# Patient Record
Sex: MALE | Race: WHITE | NOT HISPANIC OR LATINO
[De-identification: names, ages, dates, MRNs, and addresses within clinical notes are randomized per-mention and may not be internally consistent; named-entity substitution may affect disease eponyms.]

---

## 2017-02-02 PROBLEM — G47.00 INSOMNIA, UNSPECIFIED TYPE: Status: ACTIVE | Noted: 2017-02-02

## 2017-03-06 ENCOUNTER — RX RENEWAL (OUTPATIENT)
Age: 47
End: 2017-03-06

## 2017-03-06 DIAGNOSIS — K21.9 GASTRO-ESOPHAGEAL REFLUX DISEASE W/OUT ESOPHAGITIS: ICD-10-CM

## 2017-03-15 ENCOUNTER — OUTPATIENT (OUTPATIENT)
Dept: OUTPATIENT SERVICES | Facility: HOSPITAL | Age: 47
LOS: 1 days | End: 2017-03-15
Payer: COMMERCIAL

## 2017-03-15 PROCEDURE — 73590 X-RAY EXAM OF LOWER LEG: CPT | Mod: 26,LT

## 2017-03-15 PROCEDURE — 73590 X-RAY EXAM OF LOWER LEG: CPT

## 2017-04-09 ENCOUNTER — EMERGENCY (EMERGENCY)
Facility: HOSPITAL | Age: 47
LOS: 1 days | Discharge: HOME | End: 2017-04-09
Attending: EMERGENCY MEDICINE | Admitting: EMERGENCY MEDICINE
Payer: COMMERCIAL

## 2017-04-09 VITALS
TEMPERATURE: 98 F | SYSTOLIC BLOOD PRESSURE: 133 MMHG | RESPIRATION RATE: 18 BRPM | HEART RATE: 63 BPM | OXYGEN SATURATION: 100 % | DIASTOLIC BLOOD PRESSURE: 87 MMHG

## 2017-04-09 DIAGNOSIS — R07.89 OTHER CHEST PAIN: ICD-10-CM

## 2017-04-09 LAB
ALBUMIN SERPL ELPH-MCNC: 4.3 G/DL — SIGNIFICANT CHANGE UP (ref 3.4–5)
ALP SERPL-CCNC: 81 U/L — SIGNIFICANT CHANGE UP (ref 40–120)
ALT FLD-CCNC: 45 U/L — HIGH (ref 12–42)
ANION GAP SERPL CALC-SCNC: 7 MMOL/L — LOW (ref 9–16)
APTT BLD: 33.5 SEC — SIGNIFICANT CHANGE UP (ref 27.5–37.4)
AST SERPL-CCNC: 32 U/L — SIGNIFICANT CHANGE UP (ref 15–37)
BASOPHILS NFR BLD AUTO: 0.2 % — SIGNIFICANT CHANGE UP (ref 0–2)
BILIRUB SERPL-MCNC: 1 MG/DL — SIGNIFICANT CHANGE UP (ref 0.2–1.2)
BUN SERPL-MCNC: 16 MG/DL — SIGNIFICANT CHANGE UP (ref 7–23)
CALCIUM SERPL-MCNC: 9.1 MG/DL — SIGNIFICANT CHANGE UP (ref 8.5–10.5)
CHLORIDE SERPL-SCNC: 105 MMOL/L — SIGNIFICANT CHANGE UP (ref 96–108)
CHOLEST SERPL-MCNC: 127 MG/DL — SIGNIFICANT CHANGE UP
CK MB CFR SERPL CALC: 1.8 NG/ML — SIGNIFICANT CHANGE UP (ref 0.5–3.6)
CK SERPL-CCNC: 292 U/L — SIGNIFICANT CHANGE UP (ref 39–308)
CO2 SERPL-SCNC: 27 MMOL/L — SIGNIFICANT CHANGE UP (ref 22–31)
CREAT SERPL-MCNC: 0.84 MG/DL — SIGNIFICANT CHANGE UP (ref 0.5–1.3)
EOSINOPHIL NFR BLD AUTO: 0.8 % — SIGNIFICANT CHANGE UP (ref 0–6)
GLUCOSE SERPL-MCNC: 98 MG/DL — SIGNIFICANT CHANGE UP (ref 70–99)
HCT VFR BLD CALC: 42.4 % — SIGNIFICANT CHANGE UP (ref 39–50)
HDLC SERPL-MCNC: 77 MG/DL — SIGNIFICANT CHANGE UP
HGB BLD-MCNC: 15.2 G/DL — SIGNIFICANT CHANGE UP (ref 13–17)
INR BLD: 1.06 — SIGNIFICANT CHANGE UP (ref 0.88–1.16)
LIPID PNL WITH DIRECT LDL SERPL: 41 MG/DL — SIGNIFICANT CHANGE UP
LYMPHOCYTES # BLD AUTO: 46.8 % — HIGH (ref 13–44)
MCHC RBC-ENTMCNC: 30.2 PG — SIGNIFICANT CHANGE UP (ref 27–34)
MCHC RBC-ENTMCNC: 35.8 G/DL — SIGNIFICANT CHANGE UP (ref 32–36)
MCV RBC AUTO: 84.1 FL — SIGNIFICANT CHANGE UP (ref 80–100)
MONOCYTES NFR BLD AUTO: 10.4 % — SIGNIFICANT CHANGE UP (ref 2–14)
NEUTROPHILS NFR BLD AUTO: 41.8 % — LOW (ref 43–77)
PLATELET # BLD AUTO: 178 K/UL — SIGNIFICANT CHANGE UP (ref 150–400)
POTASSIUM SERPL-MCNC: 3.9 MMOL/L — SIGNIFICANT CHANGE UP (ref 3.5–5.3)
POTASSIUM SERPL-SCNC: 3.9 MMOL/L — SIGNIFICANT CHANGE UP (ref 3.5–5.3)
PROT SERPL-MCNC: 7.6 G/DL — SIGNIFICANT CHANGE UP (ref 6.4–8.2)
PROTHROM AB SERPL-ACNC: 11.8 SEC — SIGNIFICANT CHANGE UP (ref 9.8–12.7)
RBC # BLD: 5.04 M/UL — SIGNIFICANT CHANGE UP (ref 4.2–5.8)
RBC # FLD: 13.4 % — SIGNIFICANT CHANGE UP (ref 10.3–16.9)
SODIUM SERPL-SCNC: 139 MMOL/L — SIGNIFICANT CHANGE UP (ref 135–145)
TOTAL CHOLESTEROL/HDL RATIO MEASUREMENT: 1.6 RATIO — SIGNIFICANT CHANGE UP
TRIGL SERPL-MCNC: 44 MG/DL — SIGNIFICANT CHANGE UP
TROPONIN I SERPL-MCNC: <0.015 NG/ML — SIGNIFICANT CHANGE UP (ref 0.01–0.04)
WBC # BLD: 4.9 K/UL — SIGNIFICANT CHANGE UP (ref 3.8–10.5)
WBC # FLD AUTO: 4.9 K/UL — SIGNIFICANT CHANGE UP (ref 3.8–10.5)

## 2017-04-09 PROCEDURE — 99285 EMERGENCY DEPT VISIT HI MDM: CPT | Mod: 25

## 2017-04-09 PROCEDURE — 71010: CPT | Mod: 26

## 2017-04-09 PROCEDURE — 93306 TTE W/DOPPLER COMPLETE: CPT | Mod: 26

## 2017-04-09 PROCEDURE — 93010 ELECTROCARDIOGRAM REPORT: CPT

## 2017-04-09 RX ORDER — FAMOTIDINE 10 MG/ML
20 INJECTION INTRAVENOUS ONCE
Qty: 0 | Refills: 0 | Status: COMPLETED | OUTPATIENT
Start: 2017-04-09 | End: 2017-04-09

## 2017-04-09 RX ADMIN — FAMOTIDINE 20 MILLIGRAM(S): 10 INJECTION INTRAVENOUS at 14:46

## 2017-04-09 NOTE — ED ADULT TRIAGE NOTE - ARRIVAL INFO ADDITIONAL COMMENTS
Mid sternal radiates to left chest. Denies any SOB, dizziness, productive cough, fever/chills, N/V/D. Denies any cardiac/ hx.

## 2017-04-09 NOTE — ED PROVIDER NOTE - PROGRESS NOTE DETAILS
case d/w  dr josephine velasco for dc  VSS  echo neg for wall motion abnormalites-- return precautions d/w pt including recurrent CP or sob  or N/V

## 2017-04-09 NOTE — ED PROVIDER NOTE - OBJECTIVE STATEMENT
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 Neg stress thallium in the past 6 months-- No family hx of premature CAD or MI- occasional ETOH  no smoking -no clear hx of GERD

## 2017-04-09 NOTE — ED ADULT NURSE NOTE - OBJECTIVE STATEMENT
pt to ER w/ report of intermittent L-sided cp w/ varying focal points in L. chest.  Pt denies sob/f/c/n/v/diaphoresis.  Pt reports pain all night last night preventing him from sleeping and again this afternoon.  12 lead ekg done and shown to ER attending physician.  Pt maintained on CM. IV access established, labs drawn and sent. istat troponin 0.00.  NAD.  Breathing unlabored, skin warm and dry. echo cardiagram performed at bedside.  Will continue to monitor.

## 2017-04-10 PROCEDURE — 99284 EMERGENCY DEPT VISIT MOD MDM: CPT | Mod: 25

## 2017-04-10 PROCEDURE — 93306 TTE W/DOPPLER COMPLETE: CPT

## 2017-04-10 PROCEDURE — 85610 PROTHROMBIN TIME: CPT

## 2017-04-10 PROCEDURE — 82553 CREATINE MB FRACTION: CPT

## 2017-04-10 PROCEDURE — 80061 LIPID PANEL: CPT

## 2017-04-10 PROCEDURE — 80053 COMPREHEN METABOLIC PANEL: CPT

## 2017-04-10 PROCEDURE — 85730 THROMBOPLASTIN TIME PARTIAL: CPT

## 2017-04-10 PROCEDURE — 84484 ASSAY OF TROPONIN QUANT: CPT

## 2017-04-10 PROCEDURE — 96374 THER/PROPH/DIAG INJ IV PUSH: CPT

## 2017-04-10 PROCEDURE — 85025 COMPLETE CBC W/AUTO DIFF WBC: CPT

## 2017-04-10 PROCEDURE — 93005 ELECTROCARDIOGRAM TRACING: CPT

## 2017-04-10 PROCEDURE — 71045 X-RAY EXAM CHEST 1 VIEW: CPT

## 2017-04-10 PROCEDURE — 82550 ASSAY OF CK (CPK): CPT

## 2017-05-02 ENCOUNTER — APPOINTMENT (OUTPATIENT)
Dept: ORTHOPEDIC SURGERY | Facility: CLINIC | Age: 47
End: 2017-05-02

## 2017-05-08 ENCOUNTER — OUTPATIENT (OUTPATIENT)
Dept: OUTPATIENT SERVICES | Facility: HOSPITAL | Age: 47
LOS: 1 days | End: 2017-05-08
Payer: COMMERCIAL

## 2017-05-08 LAB
ALBUMIN SERPL ELPH-MCNC: 4.5 G/DL — SIGNIFICANT CHANGE UP (ref 3.4–5)
ALP SERPL-CCNC: 76 U/L — SIGNIFICANT CHANGE UP (ref 40–120)
ALT FLD-CCNC: 31 U/L — SIGNIFICANT CHANGE UP (ref 12–42)
ANION GAP SERPL CALC-SCNC: 4 MMOL/L — LOW (ref 9–16)
AST SERPL-CCNC: 29 U/L — SIGNIFICANT CHANGE UP (ref 15–37)
BASOPHILS NFR BLD AUTO: 0.2 % — SIGNIFICANT CHANGE UP (ref 0–2)
BILIRUB SERPL-MCNC: 1.2 MG/DL — SIGNIFICANT CHANGE UP (ref 0.2–1.2)
BUN SERPL-MCNC: 16 MG/DL — SIGNIFICANT CHANGE UP (ref 7–23)
CALCIUM SERPL-MCNC: 9 MG/DL — SIGNIFICANT CHANGE UP (ref 8.5–10.5)
CHLORIDE SERPL-SCNC: 106 MMOL/L — SIGNIFICANT CHANGE UP (ref 96–108)
CHOLEST SERPL-MCNC: 114 MG/DL — SIGNIFICANT CHANGE UP
CO2 SERPL-SCNC: 27 MMOL/L — SIGNIFICANT CHANGE UP (ref 22–31)
CREAT SERPL-MCNC: 0.87 MG/DL — SIGNIFICANT CHANGE UP (ref 0.5–1.3)
EOSINOPHIL NFR BLD AUTO: 0.6 % — SIGNIFICANT CHANGE UP (ref 0–6)
GLUCOSE SERPL-MCNC: 96 MG/DL — SIGNIFICANT CHANGE UP (ref 70–99)
HCT VFR BLD CALC: 42.6 % — SIGNIFICANT CHANGE UP (ref 39–50)
HDLC SERPL-MCNC: 76 MG/DL — SIGNIFICANT CHANGE UP
HGB BLD-MCNC: 14.7 G/DL — SIGNIFICANT CHANGE UP (ref 13–17)
LIPID PNL WITH DIRECT LDL SERPL: 31 MG/DL — SIGNIFICANT CHANGE UP
LYMPHOCYTES # BLD AUTO: 34.3 % — SIGNIFICANT CHANGE UP (ref 13–44)
MCHC RBC-ENTMCNC: 29.6 PG — SIGNIFICANT CHANGE UP (ref 27–34)
MCHC RBC-ENTMCNC: 34.5 G/DL — SIGNIFICANT CHANGE UP (ref 32–36)
MCV RBC AUTO: 85.7 FL — SIGNIFICANT CHANGE UP (ref 80–100)
MONOCYTES NFR BLD AUTO: 6.6 % — SIGNIFICANT CHANGE UP (ref 2–14)
NEUTROPHILS NFR BLD AUTO: 58.3 % — SIGNIFICANT CHANGE UP (ref 43–77)
PLATELET # BLD AUTO: 179 K/UL — SIGNIFICANT CHANGE UP (ref 150–400)
POTASSIUM SERPL-MCNC: 4.2 MMOL/L — SIGNIFICANT CHANGE UP (ref 3.5–5.3)
POTASSIUM SERPL-SCNC: 4.2 MMOL/L — SIGNIFICANT CHANGE UP (ref 3.5–5.3)
PROT SERPL-MCNC: 7.6 G/DL — SIGNIFICANT CHANGE UP (ref 6.4–8.2)
RBC # BLD: 4.97 M/UL — SIGNIFICANT CHANGE UP (ref 4.2–5.8)
RBC # FLD: 13.7 % — SIGNIFICANT CHANGE UP (ref 10.3–16.9)
SODIUM SERPL-SCNC: 137 MMOL/L — SIGNIFICANT CHANGE UP (ref 135–145)
TOTAL CHOLESTEROL/HDL RATIO MEASUREMENT: 1.5 RATIO — SIGNIFICANT CHANGE UP
TRIGL SERPL-MCNC: 35 MG/DL — SIGNIFICANT CHANGE UP
WBC # BLD: 6.2 K/UL — SIGNIFICANT CHANGE UP (ref 3.8–10.5)
WBC # FLD AUTO: 6.2 K/UL — SIGNIFICANT CHANGE UP (ref 3.8–10.5)

## 2017-05-08 PROCEDURE — 80061 LIPID PANEL: CPT

## 2017-05-08 PROCEDURE — 85025 COMPLETE CBC W/AUTO DIFF WBC: CPT

## 2017-05-08 PROCEDURE — 80053 COMPREHEN METABOLIC PANEL: CPT

## 2017-05-09 DIAGNOSIS — Z00.00 ENCOUNTER FOR GENERAL ADULT MEDICAL EXAMINATION WITHOUT ABNORMAL FINDINGS: ICD-10-CM

## 2017-06-01 ENCOUNTER — OUTPATIENT (OUTPATIENT)
Dept: OUTPATIENT SERVICES | Facility: HOSPITAL | Age: 47
LOS: 1 days | End: 2017-06-01
Payer: COMMERCIAL

## 2017-06-01 PROCEDURE — 87070 CULTURE OTHR SPECIMN AEROBIC: CPT

## 2017-06-01 PROCEDURE — 87075 CULTR BACTERIA EXCEPT BLOOD: CPT

## 2017-06-01 PROCEDURE — 87186 SC STD MICRODIL/AGAR DIL: CPT

## 2017-06-02 DIAGNOSIS — S81.809A UNSPECIFIED OPEN WOUND, UNSPECIFIED LOWER LEG, INITIAL ENCOUNTER: ICD-10-CM

## 2017-06-02 LAB
GRAM STN FLD: SIGNIFICANT CHANGE UP
SPECIMEN SOURCE: SIGNIFICANT CHANGE UP

## 2017-06-05 LAB
-  AMPICILLIN: SIGNIFICANT CHANGE UP
-  LINEZOLID: SIGNIFICANT CHANGE UP
CULTURE RESULTS: SIGNIFICANT CHANGE UP
METHOD TYPE: SIGNIFICANT CHANGE UP
ORGANISM # SPEC MICROSCOPIC CNT: SIGNIFICANT CHANGE UP
ORGANISM # SPEC MICROSCOPIC CNT: SIGNIFICANT CHANGE UP
SPECIMEN SOURCE: SIGNIFICANT CHANGE UP

## 2017-07-10 ENCOUNTER — FORM ENCOUNTER (OUTPATIENT)
Age: 47
End: 2017-07-10

## 2017-07-10 ENCOUNTER — OTHER (OUTPATIENT)
Age: 47
End: 2017-07-10

## 2017-07-11 ENCOUNTER — OUTPATIENT (OUTPATIENT)
Dept: OUTPATIENT SERVICES | Facility: HOSPITAL | Age: 47
LOS: 1 days | End: 2017-07-11
Payer: COMMERCIAL

## 2017-07-11 ENCOUNTER — RX RENEWAL (OUTPATIENT)
Age: 47
End: 2017-07-11

## 2017-07-11 PROCEDURE — 73721 MRI JNT OF LWR EXTRE W/O DYE: CPT | Mod: 26,LT

## 2017-07-11 PROCEDURE — 73721 MRI JNT OF LWR EXTRE W/O DYE: CPT

## 2017-09-08 ENCOUNTER — OUTPATIENT (OUTPATIENT)
Dept: OUTPATIENT SERVICES | Facility: HOSPITAL | Age: 47
LOS: 1 days | End: 2017-09-08
Payer: COMMERCIAL

## 2017-09-08 PROCEDURE — 76870 US EXAM SCROTUM: CPT | Mod: 26

## 2017-09-08 PROCEDURE — 76870 US EXAM SCROTUM: CPT

## 2017-10-02 ENCOUNTER — MEDICATION RENEWAL (OUTPATIENT)
Age: 47
End: 2017-10-02

## 2017-11-21 ENCOUNTER — APPOINTMENT (OUTPATIENT)
Dept: ORTHOPEDIC SURGERY | Facility: CLINIC | Age: 47
End: 2017-11-21
Payer: COMMERCIAL

## 2017-11-21 DIAGNOSIS — M22.2X1 PATELLOFEMORAL DISORDERS, RIGHT KNEE: ICD-10-CM

## 2017-11-21 PROCEDURE — 99213 OFFICE O/P EST LOW 20 MIN: CPT

## 2017-11-21 RX ORDER — DICLOFENAC SODIUM 100 MG/1
100 TABLET, FILM COATED, EXTENDED RELEASE ORAL
Qty: 10 | Refills: 0 | Status: DISCONTINUED | COMMUNITY
Start: 2017-09-14

## 2017-11-21 RX ORDER — DOXYCYCLINE HYCLATE 100 MG/1
100 CAPSULE ORAL
Qty: 60 | Refills: 0 | Status: DISCONTINUED | COMMUNITY
Start: 2017-08-05

## 2017-11-21 RX ORDER — HYALURONATE SOD, CROSS-LINKED 30 MG/3 ML
30 SYRINGE (ML) INTRAARTICULAR
Qty: 1 | Refills: 0 | Status: DISCONTINUED | COMMUNITY
Start: 2017-07-10 | End: 2017-09-18

## 2017-11-21 RX ORDER — AMOXICILLIN AND CLAVULANATE POTASSIUM 875; 125 MG/1; MG/1
875-125 TABLET, COATED ORAL
Qty: 14 | Refills: 0 | Status: DISCONTINUED | COMMUNITY
Start: 2017-09-14

## 2017-11-26 ENCOUNTER — FORM ENCOUNTER (OUTPATIENT)
Age: 47
End: 2017-11-26

## 2017-11-27 ENCOUNTER — OUTPATIENT (OUTPATIENT)
Dept: OUTPATIENT SERVICES | Facility: HOSPITAL | Age: 47
LOS: 1 days | End: 2017-11-27
Payer: COMMERCIAL

## 2017-11-27 PROCEDURE — 73721 MRI JNT OF LWR EXTRE W/O DYE: CPT | Mod: 26,RT

## 2017-11-27 PROCEDURE — 73721 MRI JNT OF LWR EXTRE W/O DYE: CPT

## 2018-01-25 ENCOUNTER — RX RENEWAL (OUTPATIENT)
Age: 48
End: 2018-01-25

## 2018-03-13 ENCOUNTER — APPOINTMENT (OUTPATIENT)
Dept: ORTHOPEDIC SURGERY | Facility: CLINIC | Age: 48
End: 2018-03-13
Payer: COMMERCIAL

## 2018-03-13 DIAGNOSIS — M76.899 OTHER SPECIFIED ENTHESOPATHIES OF UNSPECIFIED LOWER LIMB, EXCLUDING FOOT: ICD-10-CM

## 2018-03-13 PROCEDURE — 99214 OFFICE O/P EST MOD 30 MIN: CPT

## 2018-03-21 PROBLEM — M76.899 QUADRICEPS TENDINITIS: Status: ACTIVE | Noted: 2018-03-21

## 2018-05-14 ENCOUNTER — OUTPATIENT (OUTPATIENT)
Dept: OUTPATIENT SERVICES | Facility: HOSPITAL | Age: 48
LOS: 1 days | End: 2018-05-14
Payer: COMMERCIAL

## 2018-05-14 PROCEDURE — 73610 X-RAY EXAM OF ANKLE: CPT | Mod: 26,LT

## 2018-05-15 ENCOUNTER — OUTPATIENT (OUTPATIENT)
Dept: OUTPATIENT SERVICES | Facility: HOSPITAL | Age: 48
LOS: 1 days | End: 2018-05-15

## 2018-05-15 PROCEDURE — 73721 MRI JNT OF LWR EXTRE W/O DYE: CPT | Mod: 26,LT

## 2018-05-15 PROCEDURE — 73721 MRI JNT OF LWR EXTRE W/O DYE: CPT

## 2018-05-15 PROCEDURE — 73610 X-RAY EXAM OF ANKLE: CPT

## 2018-06-27 ENCOUNTER — APPOINTMENT (OUTPATIENT)
Dept: ORTHOPEDIC SURGERY | Facility: CLINIC | Age: 48
End: 2018-06-27

## 2018-08-03 ENCOUNTER — RX RENEWAL (OUTPATIENT)
Age: 48
End: 2018-08-03

## 2018-08-03 DIAGNOSIS — K52.9 NONINFECTIVE GASTROENTERITIS AND COLITIS, UNSPECIFIED: ICD-10-CM

## 2018-08-30 ENCOUNTER — FORM ENCOUNTER (OUTPATIENT)
Age: 48
End: 2018-08-30

## 2018-08-31 ENCOUNTER — OUTPATIENT (OUTPATIENT)
Dept: OUTPATIENT SERVICES | Facility: HOSPITAL | Age: 48
LOS: 1 days | End: 2018-08-31
Payer: COMMERCIAL

## 2018-08-31 DIAGNOSIS — M25.532 PAIN IN RIGHT WRIST: ICD-10-CM

## 2018-08-31 DIAGNOSIS — M25.531 PAIN IN RIGHT WRIST: ICD-10-CM

## 2018-08-31 PROBLEM — R07.9 CHEST PAIN, UNSPECIFIED: Chronic | Status: ACTIVE | Noted: 2017-04-09

## 2018-08-31 PROCEDURE — 73110 X-RAY EXAM OF WRIST: CPT

## 2018-08-31 PROCEDURE — 73110 X-RAY EXAM OF WRIST: CPT | Mod: 26,50

## 2018-12-10 ENCOUNTER — OUTPATIENT (OUTPATIENT)
Dept: OUTPATIENT SERVICES | Facility: HOSPITAL | Age: 48
LOS: 1 days | End: 2018-12-10
Payer: COMMERCIAL

## 2018-12-10 DIAGNOSIS — Z03.89 ENCOUNTER FOR OBSERVATION FOR OTHER SUSPECTED DISEASES AND CONDITIONS RULED OUT: ICD-10-CM

## 2018-12-10 PROCEDURE — 71046 X-RAY EXAM CHEST 2 VIEWS: CPT

## 2018-12-10 PROCEDURE — 71046 X-RAY EXAM CHEST 2 VIEWS: CPT | Mod: 26

## 2019-06-28 ENCOUNTER — OUTPATIENT (OUTPATIENT)
Dept: OUTPATIENT SERVICES | Facility: HOSPITAL | Age: 49
LOS: 1 days | End: 2019-06-28
Payer: COMMERCIAL

## 2019-06-28 DIAGNOSIS — Z00.00 ENCOUNTER FOR GENERAL ADULT MEDICAL EXAMINATION WITHOUT ABNORMAL FINDINGS: ICD-10-CM

## 2019-06-28 PROCEDURE — 87340 HEPATITIS B SURFACE AG IA: CPT

## 2019-06-28 PROCEDURE — 86803 HEPATITIS C AB TEST: CPT

## 2019-06-28 PROCEDURE — 80061 LIPID PANEL: CPT

## 2019-06-28 PROCEDURE — 86709 HEPATITIS A IGM ANTIBODY: CPT

## 2019-06-28 PROCEDURE — 82248 BILIRUBIN DIRECT: CPT

## 2019-06-28 PROCEDURE — 36415 COLL VENOUS BLD VENIPUNCTURE: CPT

## 2019-06-28 PROCEDURE — 85027 COMPLETE CBC AUTOMATED: CPT

## 2019-06-28 PROCEDURE — 80053 COMPREHEN METABOLIC PANEL: CPT

## 2019-06-28 PROCEDURE — 86704 HEP B CORE ANTIBODY TOTAL: CPT

## 2019-07-05 RX ORDER — CANDESARTAN CILEXETIL 8 MG/1
1 TABLET ORAL
Qty: 30 | Refills: 1
Start: 2019-07-05 | End: 2019-09-02

## 2019-07-05 RX ORDER — ROSUVASTATIN CALCIUM 20 MG/1
1 TABLET ORAL
Qty: 30 | Refills: 1
Start: 2019-07-05 | End: 2019-09-02

## 2019-09-02 ENCOUNTER — EMERGENCY (EMERGENCY)
Facility: HOSPITAL | Age: 49
LOS: 1 days | Discharge: HOME | End: 2019-09-02
Attending: EMERGENCY MEDICINE | Admitting: EMERGENCY MEDICINE
Payer: COMMERCIAL

## 2019-09-02 VITALS
TEMPERATURE: 98 F | HEIGHT: 72 IN | OXYGEN SATURATION: 98 % | SYSTOLIC BLOOD PRESSURE: 163 MMHG | WEIGHT: 220.02 LBS | RESPIRATION RATE: 16 BRPM | DIASTOLIC BLOOD PRESSURE: 93 MMHG | HEART RATE: 87 BPM

## 2019-09-02 LAB
ALBUMIN SERPL ELPH-MCNC: 5 G/DL — SIGNIFICANT CHANGE UP (ref 3.3–5)
ALP SERPL-CCNC: 72 U/L — SIGNIFICANT CHANGE UP (ref 40–120)
ALT FLD-CCNC: 27 U/L — SIGNIFICANT CHANGE UP (ref 10–45)
ANION GAP SERPL CALC-SCNC: 14 MMOL/L — SIGNIFICANT CHANGE UP (ref 5–17)
APTT BLD: 30.7 SEC — SIGNIFICANT CHANGE UP (ref 27.5–36.3)
AST SERPL-CCNC: 26 U/L — SIGNIFICANT CHANGE UP (ref 10–40)
BASOPHILS # BLD AUTO: 0.01 K/UL — SIGNIFICANT CHANGE UP (ref 0–0.2)
BASOPHILS NFR BLD AUTO: 0.2 % — SIGNIFICANT CHANGE UP (ref 0–2)
BILIRUB SERPL-MCNC: 0.9 MG/DL — SIGNIFICANT CHANGE UP (ref 0.2–1.2)
BUN SERPL-MCNC: 13 MG/DL — SIGNIFICANT CHANGE UP (ref 7–23)
CALCIUM SERPL-MCNC: 9.8 MG/DL — SIGNIFICANT CHANGE UP (ref 8.4–10.5)
CHLORIDE SERPL-SCNC: 101 MMOL/L — SIGNIFICANT CHANGE UP (ref 96–108)
CHOLEST SERPL-MCNC: 131 MG/DL — SIGNIFICANT CHANGE UP (ref 10–199)
CO2 SERPL-SCNC: 26 MMOL/L — SIGNIFICANT CHANGE UP (ref 22–31)
CREAT SERPL-MCNC: 0.91 MG/DL — SIGNIFICANT CHANGE UP (ref 0.5–1.3)
EOSINOPHIL # BLD AUTO: 0.6 K/UL — HIGH (ref 0–0.5)
EOSINOPHIL NFR BLD AUTO: 9.7 % — HIGH (ref 0–6)
ERYTHROCYTE [SEDIMENTATION RATE] IN BLOOD: 3 MM/HR — SIGNIFICANT CHANGE UP
ERYTHROCYTE [SEDIMENTATION RATE] IN BLOOD: 3 MM/HR — SIGNIFICANT CHANGE UP
GLUCOSE SERPL-MCNC: 131 MG/DL — HIGH (ref 70–99)
HCT VFR BLD CALC: 45.6 % — SIGNIFICANT CHANGE UP (ref 39–50)
HDLC SERPL-MCNC: 77 MG/DL — SIGNIFICANT CHANGE UP
HGB BLD-MCNC: 15.3 G/DL — SIGNIFICANT CHANGE UP (ref 13–17)
IMM GRANULOCYTES NFR BLD AUTO: 0.2 % — SIGNIFICANT CHANGE UP (ref 0–1.5)
INR BLD: 1.21 — HIGH (ref 0.88–1.16)
LIPID PNL WITH DIRECT LDL SERPL: 42 MG/DL — SIGNIFICANT CHANGE UP
LYMPHOCYTES # BLD AUTO: 2.54 K/UL — SIGNIFICANT CHANGE UP (ref 1–3.3)
LYMPHOCYTES # BLD AUTO: 41 % — SIGNIFICANT CHANGE UP (ref 13–44)
MCHC RBC-ENTMCNC: 29.6 PG — SIGNIFICANT CHANGE UP (ref 27–34)
MCHC RBC-ENTMCNC: 33.6 GM/DL — SIGNIFICANT CHANGE UP (ref 32–36)
MCV RBC AUTO: 88.2 FL — SIGNIFICANT CHANGE UP (ref 80–100)
MONOCYTES # BLD AUTO: 0.48 K/UL — SIGNIFICANT CHANGE UP (ref 0–0.9)
MONOCYTES NFR BLD AUTO: 7.7 % — SIGNIFICANT CHANGE UP (ref 2–14)
NEUTROPHILS # BLD AUTO: 2.56 K/UL — SIGNIFICANT CHANGE UP (ref 1.8–7.4)
NEUTROPHILS NFR BLD AUTO: 41.2 % — LOW (ref 43–77)
NRBC # BLD: 0 /100 WBCS — SIGNIFICANT CHANGE UP (ref 0–0)
PLATELET # BLD AUTO: 171 K/UL — SIGNIFICANT CHANGE UP (ref 150–400)
POTASSIUM SERPL-MCNC: 3.8 MMOL/L — SIGNIFICANT CHANGE UP (ref 3.5–5.3)
POTASSIUM SERPL-SCNC: 3.8 MMOL/L — SIGNIFICANT CHANGE UP (ref 3.5–5.3)
PROT SERPL-MCNC: 7.8 G/DL — SIGNIFICANT CHANGE UP (ref 6–8.3)
PROTHROM AB SERPL-ACNC: 13.8 SEC — HIGH (ref 10–12.9)
RBC # BLD: 5.17 M/UL — SIGNIFICANT CHANGE UP (ref 4.2–5.8)
RBC # FLD: 13 % — SIGNIFICANT CHANGE UP (ref 10.3–14.5)
SODIUM SERPL-SCNC: 141 MMOL/L — SIGNIFICANT CHANGE UP (ref 135–145)
TOTAL CHOLESTEROL/HDL RATIO MEASUREMENT: 1.7 RATIO — LOW (ref 3.4–9.6)
TRIGL SERPL-MCNC: 62 MG/DL — SIGNIFICANT CHANGE UP (ref 10–149)
WBC # BLD: 6.2 K/UL — SIGNIFICANT CHANGE UP (ref 3.8–10.5)
WBC # FLD AUTO: 6.2 K/UL — SIGNIFICANT CHANGE UP (ref 3.8–10.5)

## 2019-09-02 PROCEDURE — 99284 EMERGENCY DEPT VISIT MOD MDM: CPT | Mod: 25

## 2019-09-02 PROCEDURE — 70496 CT ANGIOGRAPHY HEAD: CPT | Mod: 26

## 2019-09-02 PROCEDURE — 85652 RBC SED RATE AUTOMATED: CPT

## 2019-09-02 PROCEDURE — 93005 ELECTROCARDIOGRAM TRACING: CPT

## 2019-09-02 PROCEDURE — 70498 CT ANGIOGRAPHY NECK: CPT | Mod: 26

## 2019-09-02 PROCEDURE — 70496 CT ANGIOGRAPHY HEAD: CPT

## 2019-09-02 PROCEDURE — 85025 COMPLETE CBC W/AUTO DIFF WBC: CPT

## 2019-09-02 PROCEDURE — 36415 COLL VENOUS BLD VENIPUNCTURE: CPT

## 2019-09-02 PROCEDURE — 99284 EMERGENCY DEPT VISIT MOD MDM: CPT

## 2019-09-02 PROCEDURE — 85730 THROMBOPLASTIN TIME PARTIAL: CPT

## 2019-09-02 PROCEDURE — 80061 LIPID PANEL: CPT

## 2019-09-02 PROCEDURE — 85610 PROTHROMBIN TIME: CPT

## 2019-09-02 PROCEDURE — 80053 COMPREHEN METABOLIC PANEL: CPT

## 2019-09-02 PROCEDURE — 70450 CT HEAD/BRAIN W/O DYE: CPT | Mod: 26,59

## 2019-09-02 PROCEDURE — 70498 CT ANGIOGRAPHY NECK: CPT

## 2019-09-02 PROCEDURE — 70450 CT HEAD/BRAIN W/O DYE: CPT

## 2019-09-02 PROCEDURE — 84484 ASSAY OF TROPONIN QUANT: CPT

## 2019-09-02 RX ORDER — CLOPIDOGREL BISULFATE 75 MG/1
75 TABLET, FILM COATED ORAL ONCE
Refills: 0 | Status: COMPLETED | OUTPATIENT
Start: 2019-09-02 | End: 2019-09-02

## 2019-09-02 RX ORDER — CLOPIDOGREL BISULFATE 75 MG/1
1 TABLET, FILM COATED ORAL
Qty: 30 | Refills: 0
Start: 2019-09-02 | End: 2019-10-01

## 2019-09-02 RX ADMIN — CLOPIDOGREL BISULFATE 75 MILLIGRAM(S): 75 TABLET, FILM COATED ORAL at 19:20

## 2019-09-02 NOTE — ED PROVIDER NOTE - MUSCULOSKELETAL, MLM
Acute cystitis without hematuria
Spine appears normal, range of motion is not limited, no muscle or joint tenderness

## 2019-09-02 NOTE — ED PROVIDER NOTE - NSFOLLOWUPINSTRUCTIONS_ED_ALL_ED_FT
Take plavix, aspirin, lipitor as prescribed.    Follow up with Dr. Shine tomorrow.    Transient Ischemic Attack  Image   A transient ischemic attack (TIA) is a "warning stroke" that causes stroke-like symptoms that then go away quickly. The symptoms of a TIA come on suddenly, and they last less than 24 hours. Unlike a stroke, a TIA does not cause permanent damage to the brain. It is important to know the symptoms of a TIA and what to do. Seek medical care right away, even if your symptoms go away.    Having a TIA is a sign that you are at higher risk for a permanent stroke. Lifestyle changes and medical treatments can help prevent a stroke.    What are the causes?  This condition is caused by a temporary blockage in an artery in the head or neck. The blockage does not allow the brain to get the blood supply it needs and can cause various symptoms. The blockage can be caused by:  Fatty buildup in an artery in the head or neck (atherosclerosis).  A blood clot.  Tearing of an artery (dissection).  Inflammation of an artery (vasculitis).  Sometimes the cause is not known.    What increases the risk?  Certain factors may make you more likely to develop this condition. Some of these factors are things that you can change, such as:  Obesity.  Using products that contain nicotine or tobacco, such as cigarettes and e-cigarettes.  Taking oral birth control, especially if you also use tobacco.  Lack of physical inactivity.  Excessive use of alcohol.  Use of drugs, especially cocaine and methamphetamine.  Other risk factors include:  High blood pressure (hypertension).  High cholesterol.  Diabetes mellitus.  Heart disease (coronary artery disease).  Atrial fibrillation.  Being  or .  Being over the age of 60.  Being male.  Family history of stroke.  Previous history of blood clots, stroke, TIA, or heart attack.  Sickle cell disease.  Being a woman with a history of preeclampsia.  Migraine headache.  Sleep apnea.  Chronic inflammatory diseases, such as rheumatoid arthritis or lupus.  Blood clotting disorders (hypercoagulable state).  What are the signs or symptoms?  Symptoms of this condition are the same as those of a stroke, but they are temporary. The symptoms develop suddenly, and they go away quickly, usually within minutes to hours. Symptoms may include sudden:  Weakness or numbness in your face, arm, or leg, especially on one side of your body.  Trouble walking or difficulty moving your arms or legs.  Trouble speaking, understanding speech, or both (aphasia).  Vision changes in one or both eyes. These include double vision, blurred vision, or loss of vision.  Dizziness.  Confusion.  Loss of balance or coordination.  Nausea and vomiting.  Severe headache with no known cause.  If possible, make note of the exact time that you last felt like your normal self and what time your symptoms started. Tell your health care provider.    How is this diagnosed?  This condition may be diagnosed based on:  Your symptoms and medical history.  A physical exam.  Imaging tests, usually a CT or MRI scan of the brain.  Blood tests.  You may also have other tests, including:  Electrocardiogram (ECG).  Echocardiogram.  Carotid ultrasound.  A scan of the brain circulation (CT angiogram or MRI angiogram).  Continuous heart monitoring.  How is this treated?  The goal of treatment is to reduce the risk for a subsequent stroke. Treatment may include stroke prevention therapies such as:  Changes to diet or lifestyle to decrease your risk. Lifestyle changes may include exercising and stopping smoking.  Medicines to thin the blood (antiplatelets or anticoagulants).  Blood pressure medicines.  Medicines to reduce cholesterol.  Treating other health conditions, such as diabetes or atrial fibrillation.  If testing shows that you have narrowing in the arteries to your brain, your health care provider may recommend a procedure, such as:  Carotid endarterectomy. This is a surgery to remove the blockage from your artery.  Carotid angioplasty and stenting. This is a procedure to open or widen an artery in the neck using a metal mesh tube (stent). The stent helps keep the artery open by supporting the artery walls.  Follow these instructions at home:  Medicines     Take over-the-counter and prescription medicines only as told by your health care provider.  If you were told to take a medicine to thin your blood, such as aspirin or an anticoagulant, take it exactly as told by your health care provider.  Taking too much blood-thinning medicine can cause bleeding.  If you do not take enough blood-thinning medicine, you will not have the protection that you need against a stroke and other problems.  Eating and drinking     Eat 5 or more servings of fruits and vegetables each day.  Follow instructions from your health care provider about diet. You may need to follow a certain nutrition plan to help manage risk factors for stroke, such as high blood pressure, high cholesterol, diabetes, or obesity. This may include:  Eating a low-fat, low-salt diet.  Including a lot of fiber in your diet.  Limiting the amount of carbohydrates and sugar in your diet.  Limit alcohol intake to no more than 1 drink a day for nonpregnant women and 2 drinks a day for men. One drink equals 12 oz of beer, 5 oz of wine, or 1½ oz of hard liquor.  General instructions     Maintain a healthy weight.  Stay physically active. Try to get at least 30 minutes of exercise on most or all days.  Find out if you have sleep apnea, and seek treatment if needed.  Do not use any products that contain nicotine or tobacco, such as cigarettes and e-cigarettes. If you need help quitting, ask your health care provider.  Do not abuse drugs.  Keep all follow-up visits as told by your health care provider. This is important.  Where to find more information  American Stroke Association: www.strokeassociation.org  National Stroke Association: www.stroke.org  Get help right away if:  You have chest pain or an irregular heartbeat.  You have any symptoms of stroke. The acronym BEFAST is an easy way to remember the main warning signs of stroke.  B = Balance problems. Signs include dizziness, sudden trouble walking, or loss of balance.  E = Eye problems. This includes trouble seeing or a sudden change in vision.  F = Face changes. This includes sudden weakness or numbness of the face, or the face or eyelid drooping to one side.  A = Arm weakness or numbness. This happens suddenly and usually on one side of the body.  S = Speech problems. This includes trouble speaking or trouble understanding speech.  T = Time. Time to call 911 or seek emergency care. Do not wait to see if symptoms will go away. Make note of the time your symptoms started.  Other signs of stroke may include:  A sudden, severe headache with no known cause.  Nausea or vomiting.  Seizure.  These symptoms may represent a serious problem that is an emergency. Do not wait to see if the symptoms will go away. Get medical help right away. Call your local emergency services (911 in the [place] Do not drive yourself to the hospital.     Summary  A TIA happens when an artery in the head or neck is blocked, leading to stroke-like symptoms that then go away quickly. The blockage clears before there is any permanent brain damage. A TIA is a medical emergency and requires immediate medical attention.  Symptoms of this condition are the same as those of a stroke, but they are temporary. The symptoms usually develop suddenly, and they go away quickly, usually within minutes to hours.  Having a TIA means that you are at high risk of a stroke in the near future.  Treatment may include medicines to thin the blood as well as medicines, diet changes, and lifestyle changes to manage conditions that increase the risk of another TIA or a stroke.  This information is not intended to replace advice given to you by your health care provider. Make sure you discuss any questions you have with your health care provider.    Document Released: 09/27/2006 Document Revised: 01/30/2018 Document Reviewed: 01/30/2018  Elsevier Interactive Patient Education © 2019 Elsevier Inc.

## 2019-09-02 NOTE — ED PROVIDER NOTE - PATIENT PORTAL LINK FT
You can access the FollowMyHealth Patient Portal offered by [facility] by registering at the following website: [email or web address]. By joining [facility]’s FollowMyHealth portal, you will also be able to view your health information using other applications (apps) compatible with our system.

## 2019-09-02 NOTE — ED PROVIDER NOTE - CARE PROVIDER_API CALL
Andrews Shine)  Neurology  [address], [place] [facility]  Phone: [phone]  Fax: [phone]  Follow Up Time:

## 2019-09-02 NOTE — ED ADULT NURSE NOTE - CHPI ED NUR SYMPTOMS NEG
no loss of consciousness/no confusion/no dizziness/no numbness/no vomiting/no fever/no weakness/no change in level of consciousness/no nausea

## 2019-09-02 NOTE — ED ADULT NURSE NOTE - OBJECTIVE STATEMENT
49y M, A&ox3, presents to ed c/o right eye vision change yesterday afternoon and headache. Reports vision change as thus subsided however reports continued headache since.  No numbness nor tingling. No facial droop, no slurring of speech. Ambulatory with a steady gait. No cp, no sob. 49y M, A&ox3, presents to ed c/o right eye vision change yesterday afternoon lasting 20 seconds and headache. Reports vision change as thus subsided however reports continued headache since, 2-3/10.  No numbness nor tingling. No facial droop, no slurring of speech. Ambulatory with a steady gait. No cp, no sob.

## 2019-09-02 NOTE — ED PROVIDER NOTE - CLINICAL SUMMARY MEDICAL DECISION MAKING FREE TEXT BOX
Pt with vision loss day prior followed by headache - in ED only has slight headache, no neuro deficits, stroke workup performed revealing ? decreased flow to right opthalmic artery.  Seen by Dr. Shine in ED who recommends plavix, aspirin, and lipitor.  Plavix given in ED.  Pt to continue stroke workup as an outpatient.

## 2019-09-03 ENCOUNTER — APPOINTMENT (OUTPATIENT)
Dept: NEUROLOGY | Facility: CLINIC | Age: 49
End: 2019-09-03
Payer: COMMERCIAL

## 2019-09-03 VITALS — DIASTOLIC BLOOD PRESSURE: 89 MMHG | OXYGEN SATURATION: 98 % | HEART RATE: 68 BPM | SYSTOLIC BLOOD PRESSURE: 128 MMHG

## 2019-09-03 DIAGNOSIS — G43.109 MIGRAINE WITH AURA, NOT INTRACTABLE, W/OUT STATUS MIGRAINOSUS: ICD-10-CM

## 2019-09-03 PROCEDURE — 99213 OFFICE O/P EST LOW 20 MIN: CPT

## 2019-09-04 ENCOUNTER — INBOUND DOCUMENT (OUTPATIENT)
Age: 49
End: 2019-09-04

## 2019-09-05 ENCOUNTER — RX RENEWAL (OUTPATIENT)
Age: 49
End: 2019-09-05

## 2019-09-05 DIAGNOSIS — I10 ESSENTIAL (PRIMARY) HYPERTENSION: ICD-10-CM

## 2019-09-09 DIAGNOSIS — R51 HEADACHE: ICD-10-CM

## 2019-09-09 DIAGNOSIS — G45.9 TRANSIENT CEREBRAL ISCHEMIC ATTACK, UNSPECIFIED: ICD-10-CM

## 2019-09-09 NOTE — ASSESSMENT
[FreeTextEntry1] : see optho - dilated eye exam and vf test to r/o retinal disease\par follow up after consult\par \par

## 2019-09-09 NOTE — HISTORY OF PRESENT ILLNESS
[FreeTextEntry1] : Patient was seen in the ED yesterday after he had what appeared to be visual scotomata over his right eye. Symptoms ocCured whilst watching TV and only lasted for a few short minutes. CT head AND CTA AND CTP were all negative. No previous episode and patient has not had another episode. DC by the ER on antiplatelet and continuing on his statin

## 2019-09-26 ENCOUNTER — APPOINTMENT (OUTPATIENT)
Dept: ORTHOPEDIC SURGERY | Facility: CLINIC | Age: 49
End: 2019-09-26
Payer: COMMERCIAL

## 2019-09-26 PROCEDURE — ZZZZZ: CPT

## 2020-01-02 ENCOUNTER — APPOINTMENT (OUTPATIENT)
Dept: SLEEP CENTER | Facility: HOME HEALTH | Age: 50
End: 2020-01-02

## 2020-02-11 ENCOUNTER — FORM ENCOUNTER (OUTPATIENT)
Age: 50
End: 2020-02-11

## 2020-02-12 ENCOUNTER — OUTPATIENT (OUTPATIENT)
Dept: OUTPATIENT SERVICES | Facility: HOSPITAL | Age: 50
LOS: 1 days | End: 2020-02-12
Payer: COMMERCIAL

## 2020-02-12 DIAGNOSIS — N50.819 TESTICULAR PAIN, UNSPECIFIED: ICD-10-CM

## 2020-02-12 PROCEDURE — 76870 US EXAM SCROTUM: CPT

## 2020-02-12 PROCEDURE — 76870 US EXAM SCROTUM: CPT | Mod: 26

## 2020-02-12 PROCEDURE — 93975 VASCULAR STUDY: CPT

## 2020-02-12 PROCEDURE — 93975 VASCULAR STUDY: CPT | Mod: 26

## 2020-02-13 ENCOUNTER — APPOINTMENT (OUTPATIENT)
Dept: UROLOGY | Facility: CLINIC | Age: 50
End: 2020-02-13

## 2020-02-13 ENCOUNTER — APPOINTMENT (OUTPATIENT)
Dept: UROLOGY | Facility: CLINIC | Age: 50
End: 2020-02-13
Payer: COMMERCIAL

## 2020-02-13 DIAGNOSIS — N45.1 EPIDIDYMITIS: ICD-10-CM

## 2020-02-13 LAB
BILIRUB UR QL STRIP: NORMAL
CLARITY UR: CLEAR
COLLECTION METHOD: NORMAL
GLUCOSE UR-MCNC: NORMAL
HCG UR QL: 0.2 EU/DL
HGB UR QL STRIP.AUTO: NORMAL
KETONES UR-MCNC: NORMAL
LEUKOCYTE ESTERASE UR QL STRIP: NORMAL
NITRITE UR QL STRIP: NORMAL
PH UR STRIP: 5.5
PROT UR STRIP-MCNC: NORMAL
SP GR UR STRIP: 1.03

## 2020-02-13 PROCEDURE — 81003 URINALYSIS AUTO W/O SCOPE: CPT | Mod: QW

## 2020-02-13 PROCEDURE — 99213 OFFICE O/P EST LOW 20 MIN: CPT

## 2020-02-13 NOTE — HISTORY OF PRESENT ILLNESS
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 He denies any trauma to the area. States that this pain is similar to the pain he experienced after his vasectomy. which I performed in 2016. He underwent a scrotal ultrasound yesterday evening which demonstrated normal testicles no masses good blood flow and no sign of epididymitis. Patient denies any recent similar pain or other LUTS.\par \par UA dipstick negative.

## 2020-02-13 NOTE — ED ADULT NURSE NOTE - NSSUSCREENINGQ3_ED_ALL_ED
No Debridement Text: The wound edges were debrided prior to proceeding with the closure to facilitate wound healing.

## 2020-02-13 NOTE — ASSESSMENT
[FreeTextEntry1] : Findings were consistent with acute mild epididymitis. I prescribed Cipro 500 mg twice a day for a week along with NSAID therapy and ice. Followup of this as needed. Urine sent for culture.\par \par Elizabeth Santos M.D.

## 2020-02-13 NOTE — PHYSICAL EXAM
[General Appearance - Well Developed] : well developed [Well Groomed] : well groomed [Normal Appearance] : normal appearance [General Appearance - Well Nourished] : well nourished [General Appearance - In No Acute Distress] : no acute distress [Abdomen Soft] : soft [Abdomen Tenderness] : non-tender [Abdomen Hernia] : no hernia was discovered [Costovertebral Angle Tenderness] : no ~M costovertebral angle tenderness [Urinary Bladder Findings] : the bladder was normal on palpation [Scrotum] : the scrotum was normal [Urethral Meatus] : meatus normal [Testes Mass (___cm)] : there were no testicular masses [Edema] : no peripheral edema [] : no respiratory distress [Respiration, Rhythm And Depth] : normal respiratory rhythm and effort [Affect] : the affect was normal [Oriented To Time, Place, And Person] : oriented to person, place, and time [Exaggerated Use Of Accessory Muscles For Inspiration] : no accessory muscle use [Mood] : the mood was normal [Not Anxious] : not anxious [Normal Station and Gait] : the gait and station were normal for the patient's age [FreeTextEntry1] : right epididymis minimally swollen and tender to palpation in the head and tail.

## 2020-02-18 LAB — BACTERIA UR CULT: NORMAL

## 2020-04-25 ENCOUNTER — MESSAGE (OUTPATIENT)
Age: 50
End: 2020-04-25

## 2020-05-02 LAB
SARS-COV-2 IGG SERPL IA-ACNC: <0.1 INDEX
SARS-COV-2 IGG SERPL QL IA: NEGATIVE

## 2020-06-18 PROBLEM — N50.819 ORCHALGIA: Status: ACTIVE | Noted: 2020-02-12

## 2020-10-16 DIAGNOSIS — R63.4 ABNORMAL WEIGHT LOSS: ICD-10-CM

## 2020-11-06 DIAGNOSIS — R10.83 COLIC: ICD-10-CM

## 2020-11-17 ENCOUNTER — LABORATORY RESULT (OUTPATIENT)
Age: 50
End: 2020-11-17

## 2020-11-21 ENCOUNTER — LABORATORY RESULT (OUTPATIENT)
Age: 50
End: 2020-11-21

## 2021-01-19 ENCOUNTER — OUTPATIENT (OUTPATIENT)
Dept: OUTPATIENT SERVICES | Facility: HOSPITAL | Age: 51
LOS: 1 days | End: 2021-01-19
Payer: COMMERCIAL

## 2021-01-19 PROCEDURE — 73721 MRI JNT OF LWR EXTRE W/O DYE: CPT

## 2021-01-19 PROCEDURE — 73721 MRI JNT OF LWR EXTRE W/O DYE: CPT | Mod: 26,LT

## 2021-01-21 DIAGNOSIS — Z03.818 ENCOUNTER FOR OBSERVATION FOR SUSPECTED EXPOSURE TO OTHER BIOLOGICAL AGENTS RULED OUT: ICD-10-CM

## 2021-02-03 LAB
SARS-COV-2 IGG SERPL IA-ACNC: 0.06 INDEX
SARS-COV-2 IGG SERPL QL IA: NEGATIVE

## 2021-03-01 ENCOUNTER — EMERGENCY (EMERGENCY)
Facility: HOSPITAL | Age: 51
LOS: 1 days | Discharge: HOME | End: 2021-03-01
Attending: EMERGENCY MEDICINE | Admitting: EMERGENCY MEDICINE
Payer: COMMERCIAL

## 2021-03-01 VITALS
HEART RATE: 88 BPM | TEMPERATURE: 99 F | SYSTOLIC BLOOD PRESSURE: 145 MMHG | OXYGEN SATURATION: 100 % | RESPIRATION RATE: 16 BRPM | DIASTOLIC BLOOD PRESSURE: 78 MMHG

## 2021-03-01 VITALS
DIASTOLIC BLOOD PRESSURE: 94 MMHG | SYSTOLIC BLOOD PRESSURE: 160 MMHG | HEIGHT: 72 IN | RESPIRATION RATE: 16 BRPM | TEMPERATURE: 99 F | HEART RATE: 91 BPM | OXYGEN SATURATION: 100 % | WEIGHT: 220.02 LBS

## 2021-03-01 DIAGNOSIS — Z87.891 PERSONAL HISTORY OF NICOTINE DEPENDENCE: ICD-10-CM

## 2021-03-01 DIAGNOSIS — U07.1 COVID-19: ICD-10-CM

## 2021-03-01 DIAGNOSIS — R50.9 FEVER, UNSPECIFIED: ICD-10-CM

## 2021-03-01 PROCEDURE — 99282 EMERGENCY DEPT VISIT SF MDM: CPT

## 2021-03-01 PROCEDURE — 99284 EMERGENCY DEPT VISIT MOD MDM: CPT

## 2021-03-01 NOTE — ED ADULT NURSE REASSESSMENT NOTE - NS ED NURSE REASSESS COMMENT FT1
Huddled with , JANES Milan and reviewed MAB packet and binder. Pt does not meet criteria for administational. pt informed and agreeable. Pt discharged home with pulse ox.

## 2021-03-01 NOTE — ED ADULT NURSE NOTE - CHIEF COMPLAINT QUOTE
Pt presetns with c/o "fever ( tmax unknown) and body aches" onset this afternoon. Denies any c/p, SOB. Pt reports pending COVID results.

## 2021-03-01 NOTE — ED PROVIDER NOTE - NSFOLLOWUPINSTRUCTIONS_ED_ALL_ED_FT
PLEASE REMAIN IN QUARANTINE x14 DAYS.    PLEASE REST AND REMAIN HYDRATED (EG, GATORADE, PEDIALYTE, ETC). TYLENOL AS NEEDED FOR FEVER (>100.4F/>38C).     PLEASE FOLLOW-UP WITH YOUR PCP IN 1-2 DAYS.     PLEASE RETURN TO THE EMERGENCY DEPARTMENT IF OXYGEN LEVEL <95%, SOMNOLENCE, CHEST PAIN, SHORTNESS OF BREATH, ABDOMINAL PAIN, VOMITING, OTHER CONCERNING SYMPTOMS. PLEASE REMAIN IN QUARANTINE x10 DAYS.    PLEASE REST AND REMAIN HYDRATED (EG, GATORADE, PEDIALYTE, ETC). TYLENOL AS NEEDED FOR FEVER (>100.4F/>38C).     PLEASE FOLLOW-UP WITH YOUR PCP IN 1-2 DAYS.     PLEASE RETURN TO THE EMERGENCY DEPARTMENT IF OXYGEN LEVEL <95%, SOMNOLENCE, CHEST PAIN, SHORTNESS OF BREATH, ABDOMINAL PAIN, VOMITING, OTHER CONCERNING SYMPTOMS.

## 2021-03-01 NOTE — ED PROVIDER NOTE - PHYSICAL EXAMINATION
CONST: nontoxic NAD speaking in full sentences  HEAD: atraumatic  EYES: conjunctivae clear  ENT: mmm  NECK: supple/FROM  CARD: rrr no murmurs  CHEST: ctab no r/r/w, no stridor/retractions/tripoding  ABD: soft, nd, nttp  EXT: FROM, symmetric distal pulses intact  SKIN: warm, dry, no rash, cap refill <2sec  NEURO: a+ox3, 5/5 strength x4, gross sensation intact x4, normal gait

## 2021-03-01 NOTE — ED PROVIDER NOTE - CLINICAL SUMMARY MEDICAL DECISION MAKING FREE TEXT BOX
avss. nontoxic. NAD. no systemic sx. no active cp. no acute resp distress. found to have covid pcr+ just pta. after d/w raheem yanes/spike, pt does not qualify for MAB infusion per guidelines. no indication for labs/imaging at this time. will dc w/ outpatient pcp fu, strict return precautions. pt agrees w/ plan. questions answered.

## 2021-03-01 NOTE — ED PROVIDER NOTE - PATIENT PORTAL LINK FT
You can access the FollowMyHealth Patient Portal offered by [facility] by registering at the following website: [email or web address]. By joining [facility]’s FollowMyHealth portal, you will also be able to view your health information using other applications (apps) compatible with our system.

## 2021-03-01 NOTE — ED PROVIDER NOTE - OBJECTIVE STATEMENT
50M former smoker (5py), htn, c/o <24h body aches. son's school was notified of covid exposure and so him and his son were routinely tested this AM prior to developing sx. not yet vaccinated. 50M former smoker (5py), htn, referred in by dr yanes for poss MAB infusion. pt's son's school notified families of covid exposure and so pt and his son were routinely tested this AM. prior to testing this AM, pt denies sx complaint but has since developed bodyaches and so contacted dr yanes who sent him in to see if he qualified for MAB infusion. pt was notified minutes pta that he is covid pcr+. not yet vaccinated. no fever/chills, no uri/cough, no cp/sob, no abd pain/n/v, no diarrhea, no change in appetite/po intake, no dysuria, no rash, no prior covid,no etoh-dpt/ivdu.

## 2021-03-01 NOTE — ED ADULT TRIAGE NOTE - CHIEF COMPLAINT QUOTE
Pt presetns with c/o "fever ( tmax unknown) and body aches" onset this afternoon. Denies any c/p, SOB. Pt presetns with c/o "fever ( tmax unknown) and body aches" onset this afternoon. Denies any c/p, SOB. Pt reports pending COVID results.

## 2021-03-01 NOTE — ED ADULT NURSE NOTE - OBJECTIVE STATEMENT
Pt is a 50y male presenting to ED after testing positive for covid today. Pt's only symptom is generalized body aches with onset this afternoon. Pt reports  being sent in by doctor for antibody treatment. Hx of hypertension. Pt denies SOB, chest pain, fever, chills, N/V, headache, dizziness. Respirations even and unlabored. No acute distress noted at this time.

## 2021-03-01 NOTE — ED ADULT NURSE NOTE - TEMPLATE
General Complex Repair And Ftsg Text: The defect edges were debeveled with a #15 scalpel blade.  The primary defect was closed partially with a complex linear closure.  Given the location of the defect, shape of the defect and the proximity to free margins a full thickness skin graft was deemed most appropriate to repair the remaining defect.  The graft was trimmed to fit the size of the remaining defect.  The graft was then placed in the primary defect, oriented appropriately, and sutured into place.

## 2021-05-10 DIAGNOSIS — M25.572 PAIN IN LEFT ANKLE AND JOINTS OF LEFT FOOT: ICD-10-CM

## 2021-06-02 LAB
COVID-19 SPIKE DOMAIN ANTIBODY INTERPRETATION: POSITIVE
SARS-COV-2 AB SERPL IA-ACNC: 4.82 U/ML

## 2021-06-09 RX ORDER — CANDESARTAN CILEXETIL 32 MG/1
32 TABLET ORAL DAILY
Qty: 30 | Refills: 3 | Status: ACTIVE | COMMUNITY
Start: 2021-06-09 | End: 1900-01-01

## 2021-07-06 ENCOUNTER — OUTPATIENT (OUTPATIENT)
Dept: OUTPATIENT SERVICES | Facility: HOSPITAL | Age: 51
LOS: 1 days | End: 2021-07-06
Payer: COMMERCIAL

## 2021-07-06 PROBLEM — I10 ESSENTIAL (PRIMARY) HYPERTENSION: Chronic | Status: ACTIVE | Noted: 2021-03-02

## 2021-07-06 PROCEDURE — 78830 RP LOCLZJ TUM SPECT W/CT 1: CPT

## 2021-07-06 PROCEDURE — 78830 RP LOCLZJ TUM SPECT W/CT 1: CPT | Mod: 26

## 2021-07-14 ENCOUNTER — EMERGENCY (EMERGENCY)
Facility: HOSPITAL | Age: 51
LOS: 1 days | Discharge: HOME | End: 2021-07-14
Attending: EMERGENCY MEDICINE | Admitting: EMERGENCY MEDICINE
Payer: COMMERCIAL

## 2021-07-14 VITALS
HEIGHT: 72 IN | DIASTOLIC BLOOD PRESSURE: 102 MMHG | RESPIRATION RATE: 16 BRPM | OXYGEN SATURATION: 99 % | SYSTOLIC BLOOD PRESSURE: 172 MMHG | TEMPERATURE: 98 F | HEART RATE: 83 BPM
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
## 2021-07-14 DIAGNOSIS — S61.332A: ICD-10-CM
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
## 2021-07-14 DIAGNOSIS — I10 ESSENTIAL (PRIMARY) HYPERTENSION: ICD-10-CM

## 2021-07-14 LAB
ALBUMIN SERPL ELPH-MCNC: 5.2 G/DL — HIGH (ref 3.3–5)
ALP SERPL-CCNC: 81 U/L — SIGNIFICANT CHANGE UP (ref 40–120)
ALT FLD-CCNC: 28 U/L — SIGNIFICANT CHANGE UP (ref 10–45)
ANION GAP SERPL CALC-SCNC: 10 MMOL/L — SIGNIFICANT CHANGE UP (ref 5–17)
AST SERPL-CCNC: 27 U/L — SIGNIFICANT CHANGE UP (ref 10–40)
BASOPHILS # BLD AUTO: 0.02 K/UL — SIGNIFICANT CHANGE UP (ref 0–0.2)
BASOPHILS NFR BLD AUTO: 0.4 % — SIGNIFICANT CHANGE UP (ref 0–2)
BILIRUB DIRECT SERPL-MCNC: 0.2 MG/DL — SIGNIFICANT CHANGE UP (ref 0–0.2)
BILIRUB INDIRECT FLD-MCNC: 0.8 MG/DL — SIGNIFICANT CHANGE UP (ref 0.2–1)
BILIRUB SERPL-MCNC: 1 MG/DL — SIGNIFICANT CHANGE UP (ref 0.2–1.2)
BUN SERPL-MCNC: 11 MG/DL — SIGNIFICANT CHANGE UP (ref 7–23)
CALCIUM SERPL-MCNC: 10 MG/DL — SIGNIFICANT CHANGE UP (ref 8.4–10.5)
CHLORIDE SERPL-SCNC: 104 MMOL/L — SIGNIFICANT CHANGE UP (ref 96–108)
CO2 SERPL-SCNC: 25 MMOL/L — SIGNIFICANT CHANGE UP (ref 22–31)
CREAT SERPL-MCNC: 1.01 MG/DL — SIGNIFICANT CHANGE UP (ref 0.5–1.3)
EOSINOPHIL # BLD AUTO: 0.05 K/UL — SIGNIFICANT CHANGE UP (ref 0–0.5)
EOSINOPHIL NFR BLD AUTO: 0.9 % — SIGNIFICANT CHANGE UP (ref 0–6)
GLUCOSE SERPL-MCNC: 107 MG/DL — HIGH (ref 70–99)
HBV SURFACE AB SER-ACNC: REACTIVE
HBV SURFACE AG SER-ACNC: SIGNIFICANT CHANGE UP
HCT VFR BLD CALC: 45.4 % — SIGNIFICANT CHANGE UP (ref 39–50)
HCV AB S/CO SERPL IA: 0.04 S/CO — SIGNIFICANT CHANGE UP
HCV AB SERPL-IMP: SIGNIFICANT CHANGE UP
HGB BLD-MCNC: 15.3 G/DL — SIGNIFICANT CHANGE UP (ref 13–17)
HIV 1+2 AB+HIV1 P24 AG SERPL QL IA: SIGNIFICANT CHANGE UP
IMM GRANULOCYTES NFR BLD AUTO: 0.4 % — SIGNIFICANT CHANGE UP (ref 0–1.5)
LYMPHOCYTES # BLD AUTO: 1.54 K/UL — SIGNIFICANT CHANGE UP (ref 1–3.3)
LYMPHOCYTES # BLD AUTO: 28.3 % — SIGNIFICANT CHANGE UP (ref 13–44)
MCHC RBC-ENTMCNC: 29.1 PG — SIGNIFICANT CHANGE UP (ref 27–34)
MCHC RBC-ENTMCNC: 33.7 GM/DL — SIGNIFICANT CHANGE UP (ref 32–36)
MCV RBC AUTO: 86.3 FL — SIGNIFICANT CHANGE UP (ref 80–100)
MONOCYTES # BLD AUTO: 0.48 K/UL — SIGNIFICANT CHANGE UP (ref 0–0.9)
MONOCYTES NFR BLD AUTO: 8.8 % — SIGNIFICANT CHANGE UP (ref 2–14)
NEUTROPHILS # BLD AUTO: 3.33 K/UL — SIGNIFICANT CHANGE UP (ref 1.8–7.4)
NEUTROPHILS NFR BLD AUTO: 61.2 % — SIGNIFICANT CHANGE UP (ref 43–77)
NRBC # BLD: 0 /100 WBCS — SIGNIFICANT CHANGE UP (ref 0–0)
PLATELET # BLD AUTO: 177 K/UL — SIGNIFICANT CHANGE UP (ref 150–400)
POTASSIUM SERPL-MCNC: 4.4 MMOL/L — SIGNIFICANT CHANGE UP (ref 3.5–5.3)
POTASSIUM SERPL-SCNC: 4.4 MMOL/L — SIGNIFICANT CHANGE UP (ref 3.5–5.3)
PROT SERPL-MCNC: 7.6 G/DL — SIGNIFICANT CHANGE UP (ref 6–8.3)
RBC # BLD: 5.26 M/UL — SIGNIFICANT CHANGE UP (ref 4.2–5.8)
RBC # FLD: 13.2 % — SIGNIFICANT CHANGE UP (ref 10.3–14.5)
SODIUM SERPL-SCNC: 139 MMOL/L — SIGNIFICANT CHANGE UP (ref 135–145)
WBC # BLD: 5.44 K/UL — SIGNIFICANT CHANGE UP (ref 3.8–10.5)
WBC # FLD AUTO: 5.44 K/UL — SIGNIFICANT CHANGE UP (ref 3.8–10.5)

## 2021-07-14 PROCEDURE — 87521 HEPATITIS C PROBE&RVRS TRNSC: CPT

## 2021-07-14 PROCEDURE — 86803 HEPATITIS C AB TEST: CPT

## 2021-07-14 PROCEDURE — 80076 HEPATIC FUNCTION PANEL: CPT

## 2021-07-14 PROCEDURE — 85025 COMPLETE CBC W/AUTO DIFF WBC: CPT

## 2021-07-14 PROCEDURE — 86706 HEP B SURFACE ANTIBODY: CPT

## 2021-07-14 PROCEDURE — 99284 EMERGENCY DEPT VISIT MOD MDM: CPT

## 2021-07-14 PROCEDURE — 87340 HEPATITIS B SURFACE AG IA: CPT

## 2021-07-14 PROCEDURE — 80048 BASIC METABOLIC PNL TOTAL CA: CPT

## 2021-07-14 PROCEDURE — 87389 HIV-1 AG W/HIV-1&-2 AB AG IA: CPT

## 2021-07-14 PROCEDURE — 36415 COLL VENOUS BLD VENIPUNCTURE: CPT

## 2021-07-14 PROCEDURE — 99283 EMERGENCY DEPT VISIT LOW MDM: CPT

## 2021-07-14 RX ORDER — RALTEGRAVIR 400 MG/1
400 TABLET, FILM COATED ORAL ONCE
Refills: 0 | Status: COMPLETED | OUTPATIENT
Start: 2021-07-14 | End: 2021-07-14

## 2021-07-14 RX ORDER — EMTRICITABINE AND TENOFOVIR DISOPROXIL FUMARATE 200; 300 MG/1; MG/1
1 TABLET, FILM COATED ORAL
Qty: 28 | Refills: 0
Start: 2021-07-14 | End: 2021-08-10

## 2021-07-14 RX ORDER — RALTEGRAVIR 400 MG/1
1 TABLET, FILM COATED ORAL
Qty: 56 | Refills: 0
Start: 2021-07-14 | End: 2021-08-10

## 2021-07-14 RX ORDER — EMTRICITABINE AND TENOFOVIR DISOPROXIL FUMARATE 200; 300 MG/1; MG/1
1 TABLET, FILM COATED ORAL
Qty: 56 | Refills: 0
Start: 2021-07-14 | End: 2021-08-10

## 2021-07-14 RX ORDER — EMTRICITABINE AND TENOFOVIR DISOPROXIL FUMARATE 200; 300 MG/1; MG/1
1 TABLET, FILM COATED ORAL ONCE
Refills: 0 | Status: COMPLETED | OUTPATIENT
Start: 2021-07-14 | End: 2021-07-14

## 2021-07-14 RX ADMIN — EMTRICITABINE AND TENOFOVIR DISOPROXIL FUMARATE 1 TABLET(S): 200; 300 TABLET, FILM COATED ORAL at 20:48

## 2021-07-14 RX ADMIN — RALTEGRAVIR 400 MILLIGRAM(S): 400 TABLET, FILM COATED ORAL at 20:48

## 2021-07-14 NOTE — ED PROVIDER NOTE - PATIENT PORTAL LINK FT
You can access the FollowMyHealth Patient Portal offered by [facility] by registering at the following website: [email or web address]. By joining [facility]’s FollowMyHealth portal, you will also be able to view your health information using other applications (apps) compatible with our system.

## 2021-07-14 NOTE — ED PROVIDER NOTE - NSFOLLOWUPINSTRUCTIONS_ED_ALL_ED_FT
Please follow up with S.     Needlestick and Sharps Injury    A needlestick injury happens when a person gets poked (stuck) by a needle or sharp tool (sharps) that may have someone else's blood on it. A needlestick injury can happen to a health care worker, or to anyone who is exposed to needles. The injury may expose you to blood that carries infections such as:  •Hepatitis B.      •Hepatitis C.      •Human immunodeficiency virus (HIV).    If you have a needle stick injury or think you may have been exposed to blood or body fluids:  •Wash the injured area right away with soap and water.      •Place a bandage or clean towel on the wound and apply gentle pressure to stop the bleeding. Do not squeeze or rub the area.      •Notify a work place supervisor or doctor. Follow any procedures in your work place.      •Tell your doctor if you are pregnant or breastfeeding.    Treatments may include:  •Blood tests to make sure that you have no infection.      •Tetanus shot.      •Hepatitis B shot.      •Medicines to stop or treat infection.      •Treatment for the wound.        Follow these instructions at home:    Wound care   •There are many ways to close and cover a wound. For example, a wound can be covered with sutures, skin glue, or adhesive strips. Follow instructions from your doctor about:  •How to take care of your wound.      •When and how you should change your bandage (dressing).        •Keep the bandage dry as told by your doctor.       • Do not take baths, swim, use a hot tub, or do anything that would put your wound underwater until your doctor approves.    •Check your wound every day for signs of infection. Check for:  •Redness, swelling, or pain.      •Fluid or blood.      •Pus or a bad smell.      •Warmth.        General instructions     •Take over-the-counter and prescription medicines only as told by your doctor.      •If you were prescribed an antibiotic medicine, take it as told by your doctor. Do not stop using the antibiotic even if you start to feel better.      •Keep all follow-up visits as told by your doctor. This is important.        Contact a doctor if:    •The poked area is red, swollen, or painful.      •You have a fever.      •You feel worried (anxious), mad, or sad (depressed).      •You have trouble sleeping.      •Your skin or the whites of your eyes look yellow (jaundice).      •You have belly pain or a feeling of fullness.      •You have tiredness (fatigue).      •You feel sickness in a lot of your body (malaise).      •You get infections often.        Summary    •A needlestick injury happens when a person gets poked (stuck) by a needle that may have someone else's blood on it.      •It is treated by cleaning the injured area right away with soap and water. You may get tetanus and hepatitis B shots. You may also get medicines for infections.      •Take medicine and care for your wound as told by your doctor.      This information is not intended to replace advice given to you by your health care provider. Make sure you discuss any questions you have with your health care provider.

## 2021-07-14 NOTE — ED PROVIDER NOTE - CLINICAL SUMMARY MEDICAL DECISION MAKING FREE TEXT BOX
51 year old male with no reported PMH here for needlestick injury.    body fluid exposure labs obtained  patient started on oPEP against HIV    f/u [facility].    Discharge plan was discussed with the patient and patient agrees with this plan. The patient understands Emergency Department diagnosis is a preliminary diagnosis and is often based on limited information and that the patient must adhere to the follow-up plan as discussed.  The patient understands that if symptoms worsen or if prescribed medications do not have the desired/planned effect that the patient must/may return to the closest Emergency Department at any time for further evaluation and treatment

## 2021-07-14 NOTE — ED PROVIDER NOTE - ATTENDING CONTRIBUTION TO CARE
needlestick injury with suture needle. source patient reportedly hiv+. baseline labs obtained. started pep. f/u employee health

## 2021-07-14 NOTE — ED ADULT NURSE NOTE - OBJECTIVE STATEMENT
PT presents to ED s/p needle stick injury. 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 The patient was tested and resulted HIV+

## 2021-07-14 NOTE — ED PROVIDER NOTE - OBJECTIVE STATEMENT
51 year old male with no reported PMH here for needlestick injury.  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 He washed the finger out and reported to ER for evaluation.  States source patient tested HIV positive. 78 51 year old male with no reported PMH here for needlestick injury.  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 He washed the finger out and reported to ER for evaluation.  States source patient tested HIV positive in pre-op testing.

## 2021-07-17 LAB — HCV RNA FLD QL NAA+PROBE: SIGNIFICANT CHANGE UP

## 2021-09-14 NOTE — ED PROVIDER NOTE - CONSTITUTIONAL, MLM
1. Have you been to the ER, urgent care clinic since your last visit? Hospitalized since your last visit?     no    2. Have you seen or consulted any other health care providers outside of the [address] [place] [address] since your last visit? Include any pap smears or colon screening.       No normal... Well appearing, well nourished, awake, alert, oriented to person, place, time/situation and in no apparent distress.

## 2021-12-23 LAB
ALBUMIN SERPL ELPH-MCNC: 5 G/DL
ALP BLD-CCNC: 76 U/L
ALT SERPL-CCNC: 34 U/L
ANION GAP SERPL CALC-SCNC: 9 MMOL/L
AST SERPL-CCNC: 31 U/L
BASOPHILS # BLD AUTO: 0.02 K/UL
BASOPHILS NFR BLD AUTO: 0.4 %
BILIRUB SERPL-MCNC: 0.7 MG/DL
BUN SERPL-MCNC: 15 MG/DL
CALCIUM SERPL-MCNC: 10.3 MG/DL
CHLORIDE SERPL-SCNC: 104 MMOL/L
CO2 SERPL-SCNC: 27 MMOL/L
CREAT SERPL-MCNC: 0.98 MG/DL
EOSINOPHIL # BLD AUTO: 0.05 K/UL
EOSINOPHIL NFR BLD AUTO: 0.9 %
GLUCOSE SERPL-MCNC: 107 MG/DL
HCT VFR BLD CALC: 45.3 %
HGB BLD-MCNC: 15.6 G/DL
IMM GRANULOCYTES NFR BLD AUTO: 0.4 %
LYMPHOCYTES # BLD AUTO: 1.94 K/UL
LYMPHOCYTES NFR BLD AUTO: 34.2 %
MAN DIFF?: NORMAL
MCHC RBC-ENTMCNC: 30.1 PG
MCHC RBC-ENTMCNC: 34.4 GM/DL
MCV RBC AUTO: 87.3 FL
MONOCYTES # BLD AUTO: 0.47 K/UL
MONOCYTES NFR BLD AUTO: 8.3 %
NEUTROPHILS # BLD AUTO: 3.18 K/UL
NEUTROPHILS NFR BLD AUTO: 55.8 %
PLATELET # BLD AUTO: 199 K/UL
POTASSIUM SERPL-SCNC: 5 MMOL/L
PROT SERPL-MCNC: 7.9 G/DL
RBC # BLD: 5.19 M/UL
RBC # FLD: 13.1 %
SODIUM SERPL-SCNC: 140 MMOL/L
WBC # FLD AUTO: 5.68 K/UL

## 2022-01-24 ENCOUNTER — NON-APPOINTMENT (OUTPATIENT)
Age: 52
End: 2022-01-24

## 2022-01-24 ENCOUNTER — APPOINTMENT (OUTPATIENT)
Dept: OTOLARYNGOLOGY | Facility: CLINIC | Age: 52
End: 2022-01-24
Payer: COMMERCIAL

## 2022-01-24 DIAGNOSIS — U07.1 COVID-19: ICD-10-CM

## 2022-01-24 DIAGNOSIS — J11.1 INFLUENZA DUE TO UNIDENTIFIED INFLUENZA VIRUS WITH OTHER RESPIRATORY MANIFESTATIONS: ICD-10-CM

## 2022-01-24 DIAGNOSIS — Z78.9 OTHER SPECIFIED HEALTH STATUS: ICD-10-CM

## 2022-01-24 DIAGNOSIS — G89.29 PAIN IN THROAT: ICD-10-CM

## 2022-01-24 DIAGNOSIS — R07.0 PAIN IN THROAT: ICD-10-CM

## 2022-01-24 DIAGNOSIS — Z92.29 PERSONAL HISTORY OF OTHER DRUG THERAPY: ICD-10-CM

## 2022-01-24 DIAGNOSIS — J37.0 CHRONIC LARYNGITIS: ICD-10-CM

## 2022-01-24 DIAGNOSIS — R13.10 DYSPHAGIA, UNSPECIFIED: ICD-10-CM

## 2022-01-24 PROCEDURE — 99203 OFFICE O/P NEW LOW 30 MIN: CPT | Mod: 25

## 2022-01-24 PROCEDURE — 31575 DIAGNOSTIC LARYNGOSCOPY: CPT

## 2022-01-24 RX ORDER — CIPROFLOXACIN HYDROCHLORIDE 500 MG/1
500 TABLET, FILM COATED ORAL
Qty: 60 | Refills: 0 | Status: COMPLETED | COMMUNITY
Start: 2018-08-03 | End: 2018-09-20

## 2022-01-25 PROBLEM — Z92.29 COVID-19 VACCINE SERIES COMPLETED: Status: RESOLVED | Noted: 2022-01-25 | Resolved: 2022-01-25

## 2022-01-25 PROBLEM — R07.0 CHRONIC THROAT PAIN: Status: ACTIVE | Noted: 2022-01-25

## 2022-01-25 RX ORDER — CANDESARTAN CILEXETIL 32 MG/1
32 TABLET ORAL DAILY
Qty: 30 | Refills: 0 | Status: COMPLETED | COMMUNITY
Start: 2019-09-05 | End: 2019-11-25

## 2022-01-25 RX ORDER — OSELTAMIVIR PHOSPHATE 75 MG/1
75 CAPSULE ORAL TWICE DAILY
Qty: 1 | Refills: 0 | Status: COMPLETED | COMMUNITY
Start: 2020-03-04 | End: 2020-03-25

## 2022-01-25 RX ORDER — LACTULOSE SOLUTION USP, 10 G/15 ML 10 G/15ML
10 SOLUTION ORAL; RECTAL
Qty: 30 | Refills: 0 | Status: COMPLETED | COMMUNITY
Start: 2018-03-07 | End: 2019-01-25

## 2022-01-25 RX ORDER — AZITHROMYCIN 250 MG/1
250 TABLET, FILM COATED ORAL
Qty: 6 | Refills: 1 | Status: COMPLETED | COMMUNITY
Start: 2020-03-04 | End: 2020-03-11

## 2022-01-25 RX ORDER — CHLORDIAZEPOXIDE HYDROCHLORIDE AND CLIDINIUM BROMIDE 5; 2.5 MG/1; MG/1
5-2.5 CAPSULE ORAL 3 TIMES DAILY
Qty: 60 | Refills: 0 | Status: COMPLETED | COMMUNITY
Start: 2018-08-03 | End: 2018-08-25

## 2022-01-25 RX ORDER — NALTREXONE HYDROCHLORIDE AND BUPROPION HYDROCHLORIDE 8; 90 MG/1; MG/1
8-90 TABLET, EXTENDED RELEASE ORAL
Qty: 120 | Refills: 3 | Status: DISCONTINUED | COMMUNITY
Start: 2020-10-16 | End: 2022-01-24

## 2022-01-25 RX ORDER — SUCRALFATE 1 G/1
1 TABLET ORAL
Qty: 120 | Refills: 2 | Status: COMPLETED | COMMUNITY
Start: 2018-01-25 | End: 2018-03-25

## 2022-01-25 RX ORDER — CHLORDIAZEPOXIDE HYDROCHLORIDE AND CLIDINIUM BROMIDE 5; 2.5 MG/1; MG/1
5-2.5 CAPSULE, GELATIN COATED ORAL EVERY 6 HOURS
Qty: 120 | Refills: 0 | Status: DISCONTINUED | COMMUNITY
Start: 2020-11-06 | End: 2022-01-24

## 2022-01-25 RX ORDER — DICYCLOMINE HYDROCHLORIDE 10 MG/1
10 CAPSULE ORAL
Qty: 30 | Refills: 0 | Status: DISCONTINUED | COMMUNITY
Start: 2018-02-22 | End: 2022-01-24

## 2022-01-25 RX ORDER — LINACLOTIDE 72 UG/1
72 CAPSULE, GELATIN COATED ORAL
Qty: 30 | Refills: 0 | Status: COMPLETED | COMMUNITY
Start: 2018-03-07 | End: 2018-03-25

## 2022-01-25 RX ORDER — SUCRALFATE 1 G/10ML
1 SUSPENSION ORAL 3 TIMES DAILY
Qty: 1 | Refills: 3 | Status: COMPLETED | COMMUNITY
Start: 2018-01-26 | End: 2018-04-25

## 2022-01-25 RX ORDER — DEXLANSOPRAZOLE 60 MG/1
60 CAPSULE, DELAYED RELEASE ORAL DAILY
Qty: 30 | Refills: 2 | Status: DISCONTINUED | COMMUNITY
Start: 2017-03-06 | End: 2022-01-24

## 2022-01-25 RX ORDER — RIFAXIMIN 200 MG/1
200 TABLET ORAL 3 TIMES DAILY
Qty: 30 | Refills: 0 | Status: COMPLETED | COMMUNITY
Start: 2018-08-03 | End: 2018-08-25

## 2022-01-25 NOTE — PHYSICAL EXAM
[] : septum deviated to the left [Midline] : trachea located in midline position [Laryngoscopy Performed] : laryngoscopy was performed, see procedure section for findings [Normal] : no rashes [FreeTextEntry1] : No hoarseness.  [de-identified] : Dried secretions in nasal vestibules. [de-identified] : mild erythema soft palate. [de-identified] : Carotid pulses 2+ bilateral.

## 2022-01-25 NOTE — ASSESSMENT
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  He has odynophagia.\par Exam today shows a mild supraglottitis and pharyngitis.\par He has occasional heartburn but i would not expect this much laryngeal involvement with typical GERD unless severe or repeated vomiting.\par \par PLAN\par --> start Augmentin.\par --> reflux precautions\par \par

## 2022-01-25 NOTE — HISTORY OF PRESENT ILLNESS
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 \par Chronic sharp throat pain (lower, both sides) constant x past 4 weeks, following the flu.  Has pain with swallowing, but no change in diet.  No voice change, difficulty breathing or ear pain.  Occasional heartburn.\par Had COVID in early Dec but sx gone after 2 days.\par Not a smoker.\par

## 2022-01-25 NOTE — PROCEDURE
[de-identified] : \par Indication:  odynophagia\par -Verbal consent was obtained from patient prior to procedure.\par -Trey-Synephrine spray applied to the nasal cavities.\par Flexible laryngoscopy was performed via  right nostril and revealed the following:\par   -- Nasopharynx had no mass or exudate.\par   -- Base of tongue was symmetric and not enlarged.\par   -- Vallecula was clear.  No cobblestoning posterior pharyngeal wall. \par   -- Epiglottis, both aryepiglottic folds, arytenoids and both false vocal folds were mild-moderately erythematous and mildly edematous.\par   -- True vocal folds were fully mobile and without lesions. \par   -- Post cricoid area was clear.\par   -- Interarytenoid edema was  present.\par   -- No lesions in laryngopharynx\par \par The patient tolerated the procedure well.\par

## 2022-05-19 ENCOUNTER — APPOINTMENT (OUTPATIENT)
Dept: CT IMAGING | Facility: HOSPITAL | Age: 52
End: 2022-05-19

## 2022-05-19 ENCOUNTER — RESULT REVIEW (OUTPATIENT)
Age: 52
End: 2022-05-19

## 2022-05-19 ENCOUNTER — OUTPATIENT (OUTPATIENT)
Dept: OUTPATIENT SERVICES | Facility: HOSPITAL | Age: 52
LOS: 1 days | End: 2022-05-19
Payer: COMMERCIAL

## 2022-05-19 LAB — POCT ISTAT CREATININE: 0.9 MG/DL — SIGNIFICANT CHANGE UP (ref 0.5–1.3)

## 2022-05-19 PROCEDURE — 75574 CT ANGIO HRT W/3D IMAGE: CPT | Mod: 26

## 2022-05-19 PROCEDURE — 75574 CT ANGIO HRT W/3D IMAGE: CPT

## 2022-05-19 PROCEDURE — 82565 ASSAY OF CREATININE: CPT

## 2022-05-20 ENCOUNTER — OUTPATIENT (OUTPATIENT)
Dept: OUTPATIENT SERVICES | Facility: HOSPITAL | Age: 52
LOS: 1 days | End: 2022-05-20
Payer: COMMERCIAL

## 2022-05-20 ENCOUNTER — RESULT REVIEW (OUTPATIENT)
Age: 52
End: 2022-05-20

## 2022-05-20 PROCEDURE — 0502T: CPT

## 2022-05-20 PROCEDURE — 0503T: CPT

## 2022-05-20 PROCEDURE — 0504T: CPT

## 2022-09-14 DIAGNOSIS — Z00.00 ENCOUNTER FOR GENERAL ADULT MEDICAL EXAMINATION W/OUT ABNORMAL FINDINGS: ICD-10-CM

## 2022-09-19 LAB
ALBUMIN SERPL ELPH-MCNC: 5.1 G/DL
ALP BLD-CCNC: 74 U/L
ALT SERPL-CCNC: 31 U/L
ANION GAP SERPL CALC-SCNC: 13 MMOL/L
AST SERPL-CCNC: 26 U/L
BASOPHILS # BLD AUTO: 0.04 K/UL
BASOPHILS NFR BLD AUTO: 0.7 %
BILIRUB SERPL-MCNC: 0.4 MG/DL
BUN SERPL-MCNC: 18 MG/DL
CALCIUM SERPL-MCNC: 10.1 MG/DL
CHLORIDE SERPL-SCNC: 104 MMOL/L
CO2 SERPL-SCNC: 24 MMOL/L
CREAT SERPL-MCNC: 1.01 MG/DL
EGFR: 89 ML/MIN/1.73M2
EOSINOPHIL # BLD AUTO: 0.09 K/UL
EOSINOPHIL NFR BLD AUTO: 1.5 %
GLUCOSE SERPL-MCNC: 121 MG/DL
HBV CORE IGG+IGM SER QL: NONREACTIVE
HCT VFR BLD CALC: 44.4 %
HGB BLD-MCNC: 14.9 G/DL
IMM GRANULOCYTES NFR BLD AUTO: 0.3 %
LYMPHOCYTES # BLD AUTO: 1.99 K/UL
LYMPHOCYTES NFR BLD AUTO: 32.6 %
MAN DIFF?: NORMAL
MCHC RBC-ENTMCNC: 29.9 PG
MCHC RBC-ENTMCNC: 33.6 GM/DL
MCV RBC AUTO: 89.2 FL
MEV IGG FLD QL IA: >300 AU/ML
MEV IGG+IGM SER-IMP: POSITIVE
MONOCYTES # BLD AUTO: 0.56 K/UL
MONOCYTES NFR BLD AUTO: 9.2 %
MUV AB SER-ACNC: POSITIVE
MUV IGG SER QL IA: >300 AU/ML
NEUTROPHILS # BLD AUTO: 3.4 K/UL
NEUTROPHILS NFR BLD AUTO: 55.7 %
PLATELET # BLD AUTO: 189 K/UL
POTASSIUM SERPL-SCNC: 5.1 MMOL/L
PROT SERPL-MCNC: 7.3 G/DL
RBC # BLD: 4.98 M/UL
RBC # FLD: 13.2 %
RUBV IGG FLD-ACNC: 19 INDEX
RUBV IGG SER-IMP: POSITIVE
SODIUM SERPL-SCNC: 141 MMOL/L
VZV AB TITR SER: POSITIVE
VZV IGG SER IF-ACNC: 1000 INDEX
WBC # FLD AUTO: 6.1 K/UL

## 2022-11-02 DIAGNOSIS — I82.409 ACUTE EMBOLISM AND THROMBOSIS OF UNSPECIFIED DEEP VEINS OF UNSPECIFIED LOWER EXTREMITY: ICD-10-CM

## 2022-11-02 RX ORDER — APIXABAN 5 MG (74)
5 KIT ORAL
Qty: 74 | Refills: 0 | Status: ACTIVE | COMMUNITY
Start: 2022-11-02 | End: 1900-01-01

## 2022-11-14 NOTE — ED ADULT NURSE NOTE - NS ED NURSE LEVEL OF CONSCIOUSNESS SPEECH
Impression: Combined forms of age-related cataract, bilateral: H25.813. Plan: Discussed diagnosis of cataracts with patient. Patient has no significant visual complaints at this time and is able to perform all their daily activities. We will re-evaluate cataract on return visit unless patient notes any changes sooner. Speaking Coherently

## 2023-03-22 NOTE — ED ADULT TRIAGE NOTE - TEMPERATURE IN FAHRENHEIT (DEGREES F)
Interventional Radiology Outpatient Documentation    PREOPERATIVE DAY OF PROCEDURE EVALUATION:     I have personally seen and examined this patient. I agree with the history and physical which I have reviewed.     Home Medications:  omeprazole 40 mg oral delayed release capsule: 1 cap(s) orally once a day (16 Mar 2023 07:54)    Allergies:   lactose (Diarrhea)  No Known Allergies    Labs: reviewed    Plan is for liver parenchyma biopsy   Procedure/ risks/ benefits/ goals/ alternatives were explained. All questions answered. Informed content obtained from patient. Consent placed in chart.   
INTERVENTIONAL RADIOLOGY BRIEF-OPERATIVE NOTE    Procedure: liver biopsy    Pre-Op Diagnosis: elevated lft    Post-Op Diagnosis: same as pre    Attending: ru  Resident:     Anesthesia (type):  [ ] General Anesthesia  [ ] Deep Sedation - provided by anesthesiologist  [ x] Conscious Sedation -  1mg Versed and 50mcg Fentanyl   [ x] Local/Regional    Contrast: 0    Estimated Blood Loss: <1cc    Condition:   [ ] Critical  [ ] Serious  [ ] Fair   [x ] Good    Findings/Follow up Plan of Care: successfull liver biopsy with US guidance.     Specimens Removed: 18G core    Implants: none    Complications: no immediate    Disposition: recovery then home      Please call Interventional Radiology [phone] with any questions, concerns, or issues.        
97.9

## 2023-06-14 NOTE — ED ADULT NURSE NOTE - NURSING MUSC ROM
[facility]    RADIATION ONCOLOGY CONSULTATION      Colin Baxter  is referred by No ref. provider found for an oncology consultation.    PRIMARY PHYSICIAN: Libia Wallace     Cancer Staging   Ureter cancer (H)  Staging form: Renal Pelvis and Ureter, AJCC 8th Edition  - Clinical: No stage assigned - Unsigned  - Pathologic stage from 6/11/2023: Stage IV (pT2, pN0, pM1) - Signed by Jhony Bartlett MD on 6/11/2023        IMPRESSION 75-year-old male with known urothelial carcinoma of the left ureter diagnosed by biopsy January 2021 and subsequently treated with definitive left ureteronephrectomy June 16, 2021.  Pathology revealed a resected node negative grade 3 margin negative LVI -4.2 cm pathologic T3 N0 M0 urothelial carcinoma of the left ureter.  He was diagnosed with atypical cells by urine cytology October 2021 and underwent BCG installation with urology.  He was treated with nivolumab for 8 cycles for his urothelial carcinoma.  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  He has complained of left hip pain since the winter 2023 and has undergone extensive imaging.  PET/CT January 5, 2023 was read as negative.  Plain x-ray left hip April 13, 2023 was read as normal.  MR of the abdomen and pelvis April 17, 2023 confirmed bone metastasis and liver disease.  The patient has undergone staging with CT chest and he seen Dr. Bartlett, his medical oncologist on April 24, 2023 as well as again in June 2023.  Dr. Bartlett facilitated a bone scan which showed widely disseminated metastatic disease in the setting of a femur curette/bone biopsy May 23, 2023 with pathology consistent with metastatic urothelial carcinoma.  Patient's been referred to radiation to discuss palliative therapy.  He is on oxycodone at present but has not received his long acting oxycontin  b/c of insurance issues.  He is seen today to discuss options referable for pain  control.      After performing the history and physical exam, the patient appears neurologically intact.  He is minimally irritated from the disease in the lumbar spine.  He is most symptomatic in the left leg and feels better now that he went up on the oxycodone.  We discussed and reviewed his imaging online in PACS system.  I am concerned that he might fracture the upper left femur because of significant cortical erosion that spans at least 1-1/2 to 2 cm in the shaft posteriorly.  I think he should be evaluated by surgery for lizett placement. If lizett placement ensues, we will see him back in clinic 2 weeks post op for reevaluation and simulation to the spine and left hip and femur.  Should ortho surgery and the patient decline lizett placement, the we will proceed with palliative radiation to the left hip and upper femur and lower lumbar spine to prevent any long-term issues in that region and proceed with left hip and femur therapy The patient can then proceed with systemic therapy.  I   recommend that he move from OxyContin to MS Contin given insurance constraints is more than likely the MS Contin is cheaper.  Perla Ramesh will help facilitate this.  He could also try pulse of steroids, short course of 1 week of daily dexamethasone to see if this helps mitigate some of the inflammation associated with the metastasis.  We discussed that radiation is delivered Monday through Friday 1 treatment today over an order of anywhere from 1 to 2 weeks.  Given the potential for the urothelial carcinoma to be refractory to radiation I favor proceeding with 1 to 2 weeks of treatment to both the lumbar spine and upper femur and left hip at the appropriate time.  Should he proceed with lizett placmement, then we would see him 1 - 2 weeks postop to proceed with reeval, consent, mapping and therapy.  Side effects of radiation were discussed which could include skin irritation, fatigue, bone irritation, bone fracture, persistent disease,  persistent pain.  I discussed that radiation may confer pain control in  2/3-3/4 of patients regardless of histology who receive palliative radiation to symptomatic bony disease.  I discussed that once radiation is completed he could begin systemic therapy per routine with Dr. Bartlett.  Questions were solicited and answered throughout this entire conversation. The patient is amenable to the overall plan.       PLAN:     Port placement planned June 20, 2023  MRI left hip June 19, 2023  To consider palliative radiation to symptomatic sites of disease.  Consideration of lizett placement in the left femur for stabilization long-term.  Will refer to orthopedic surgery locally to see if this is warranted given concerns for cortical breakthrough posterior left upper femur.  Continue with pain medication, on MS Contin twice daily with oxycodone for breakthrough.  Add short course of dexamethasone pulse daily for 1 week to help mitigate pain.  Follow-up in radiation regarding finalization of a treatment plan once evaluation by surgery is complete.  Needs a bowel regimen on a consistent basis to help move his bowels given long-term need for narcotics.    ______________________________________________________________________  HISTORY OF PRESENT ILLNESS: Colin is a 75-year-old male patient who presents for radiation therapy consultation with metastatic urothelial carcinoma affecting the bone particularly his left femur and left hip.  History in short as below    1/18/2021 bladder biopsy: Benign urothelium with reactive changes.  Left ureter mass: High-grade urothelial carcinoma    6/16/2021 lymph node, left common iliac: 2 benign lymph nodes.  Left kidney and left ureter, nephroureterectomy: Multifocal invasive high-grade urothelial carcinoma of the ureter.  Largest size measures 4.2 cm.  Multifocal carcinoma in situ.  Carcinoma invades periureteric adventitial tissue.  Margins free of tumor.    11/5/2021 urinary bladder,  posterior wall, biopsy: Fragments of urothelial mucosa involved by urothelial carcinoma in situ.  Urinary bladder, right lateral wall, biopsy: Atypical cells insufficient for diagnosis.  Denuded urothelial mucosa with acute and chronic inflammation    1/5/2023 negative UroVysion test    1/25/2023 PET oncology eyes to thighs: Prior left nephrectomy.  The known hypoenhancing pancreatic lesion is not well visualized and has no discrete hypermetabolism associated with the lesion    4/13/2023 left hip x-ray: X-ray shows normal left hip    4/17/2023 CT chest: New L2 sclerotic lesion most compatible with new bony metastatic disease, consider bone scan.    4/17/2023 MRI abdomen with and without contrast: Progressive hepatic metastatic disease, new bony metastatic disease at L2 and L3    4/27/2023 whole-body bone scan: Multiple areas of abnormal uptake consistent with bony metastatic disease    5/3/2023 CT lumbar spine without contrast: Osteoblastic metastatic disease involving L2 vertebral body in the left L3 pedicle.  These metastatic lesions were not noted on the CT scan from 10/2022.    5/3/2023 CT pelvis bone without contrast: Osteoblastic metastatic disease within the right ilium and roof of the left acetabulum.  Mixed osteolytic osteoblastic metastatic disease seen in the femoral shaft on the left.    5/23/2023 left femur curettings: Metastatic carcinoma consistent with prior known invasive urothelial cell carcinoma    6/12/2023 medical oncology visit: Patient has been under systemic therapy.  Patient underwent 8 cycles of nivolumab, he unfortunately developed colitis which required hospital admission.  Planning to start enfortumab-vedotin with reevaluation after 3 cycles.  Additionally increased pain regimen to OxyContin 20 mg Q12, with oxycodone 10 mg every 6 hours as needed for breakthrough pain.    6/19/2023 MRI left hip with and without contrast: Pending    Scheduling Conflicts:   Systemic Therapy:   Port:  pending  Pacemaker: none  Hx of autoimmune disorders:none  Previous Radiation Therapy: none                        Past Medical History:   Diagnosis Date     Acquired hypothyroidism      Anemia      Benign essential hypertension      Bladder cancer (H)      Carcinoma in situ of bladder 11/19/2021     Chronic kidney disease      Diabetes mellitus, type 2 (H)      Dyslipidemia      Glaucoma      Hyperlipidemia      Malignant neoplasm of anterior wall of urinary bladder (H) 12/11/2020    getting BCG (from his Urologist in [place], [place])     Obesity      Pancreatic mass      Renal cell carcinoma, left (H) 2021    Left nephrectomy     Ureter cancer, left (H)        Past Surgical History:   Procedure Laterality Date     APPENDECTOMY  1958     COLONOSCOPY  2-     COLONOSCOPY N/A 9/13/2022    Procedure: COLONOSCOPY;  Surgeon: Keyno Zimmerman MD;  Location: [facility] OR     COLONOSCOPY N/A 2/13/2023    Procedure: COLONOSCOPY;  Surgeon: Antony Croft MD;  Location: [facility] OR     COMBINED CYSTOSCOPY, RETROGRADES, URETEROSCOPY, INSERT STENT Left 1/18/2021    Procedure: CYSTOURETEROSCOPY, WITH RETROGRADE PYELOGRAM with interpretation of fluroscopy, ureteroscopy, ureteral biopsy, bladder biopsy and fulgaration;  Surgeon: Shonda Morrell MD;  Location: [facility] OR     CYSTOSCOPY N/A 6/16/2021    Procedure: CYSTOSCOPY;  Surgeon: Javier Mcnulty MD;  Location: [facility] OR     CYSTOSCOPY, BIOPSY BLADDER, COMBINED N/A 9/8/2015    Procedure: COMBINED CYSTOSCOPY, BIOPSY BLADDER;  Surgeon: Randy Martinez MD;  Location: [facility] OR     CYSTOSCOPY, BIOPSY BLADDER, COMBINED N/A 2/14/2017    Procedure: COMBINED CYSTOSCOPY, BIOPSY BLADDER;  Surgeon: Randy Martinez MD;  Location: [facility] OR     CYSTOSCOPY, BIOPSY BLADDER, COMBINED N/A 11/13/2018    Procedure: COMBINED CYSTOSCOPY, BIOPSY BLADDER;  Surgeon: Ed Helm MD;  Location: [facility] OR     CYSTOSCOPY, BIOPSY BLADDER, COMBINED N/A 11/5/2021    Procedure:  CYSTOSCOPY, WITH BLADDER BIOPSY;  Surgeon: Shonda Morrell MD;  Location: [facility] OR     CYSTOSCOPY, RETROGRADES, COMBINED Bilateral 11/13/2018    Procedure: Bilateral Retrograde Pyelagram, Bladder Biopsy;  Surgeon: Ed Helm MD;  Location: [facility] OR     CYSTOSCOPY, RETROGRADES, COMBINED Right 11/5/2021    Procedure: CYSTOSCOPY, WITH RETROGRADE PYELOGRAM;  Surgeon: Shonda Morrell MD;  Location: [facility]     CYSTOSCOPY, RETROGRADES, INSERT STENT URETER(S), COMBINED Left 9/8/2015    Procedure: COMBINED CYSTOSCOPY, RETROGRADES, INSERT STENT URETER(S);  Surgeon: Randy Martinez MD;  Location: [facility]     CYSTOSCOPY, TRANSURETHRAL RESECTION (TUR) TUMOR BLADDER, COMBINED N/A 9/8/2015    Procedure: COMBINED CYSTOSCOPY, TRANSURETHRAL RESECTION (TUR) TUMOR BLADDER;  Surgeon: Randy Martinez MD;  Location: [facility]     CYSTOSCOPY, TRANSURETHRAL RESECTION (TUR) TUMOR BLADDER, COMBINED N/A 1/12/2016    Procedure: COMBINED CYSTOSCOPY, TRANSURETHRAL RESECTION (TUR) TUMOR BLADDER;  Surgeon: Randy Martinez MD;  Location: [facility]     CYSTOSCOPY, TRANSURETHRAL RESECTION (TUR) TUMOR BLADDER, COMBINED N/A 9/13/2016    Procedure: COMBINED CYSTOSCOPY, TRANSURETHRAL RESECTION (TUR) TUMOR BLADDER;  Surgeon: Randy Martinez MD;  Location: [facility]     DAVINCI NEPHROURETERECTOMY Left 6/16/2021    Procedure: NEPHROURETERECTOMY, ROBOT-ASSISTED, lymph node dissection;  Surgeon: Javier Mcnulty MD;  Location: [facility] OR     ESOPHAGOSCOPY, GASTROSCOPY, DUODENOSCOPY (EGD), COMBINED N/A 9/30/2022    Procedure: UPPER ENDOSCOPIC ULTRASOUND;  Surgeon: Jason Dennis MD;  Location[facility]     PHACOEMULSIFICATION WITH STANDARD INTRAOCULAR LENS IMPLANT Right 10/9/2018    Procedure: PHACOEMULSIFICATION WITH STANDARD INTRAOCULAR LENS IMPLANT;  PHACOEMULSIFICATION CATARACT EXTRACTION POSTERIOR CHAMBER LENS RIGHT;  Surgeon: Marlo Mcconnell MD;  Location: [facility] OR     PHACOEMULSIFICATION WITH STANDARD INTRAOCULAR LENS  IMPLANT Left 10/23/2018    Procedure: PHACOEMULSIFICATION CATARACT EXTRACTION POSTERIOR CHAMBER LENS LEFT;  Surgeon: Marlo Mcconnell MD;  Location: [facility] OR       Family History   Problem Relation Age of Onset     Diabetes Mother      Other - See Comments Father         cause of death unknown     Parkinsonism Brother      Coronary Artery Disease No family hx of      Hypertension No family hx of      Hyperlipidemia No family hx of      Cerebrovascular Disease No family hx of      Breast Cancer No family hx of      Colon Cancer No family hx of      Prostate Cancer No family hx of      Anesthesia Reaction No family hx of      Asthma No family hx of      Thyroid Disease No family hx of      Genetic Disorder No family hx of        Social History     Tobacco Use     Smoking status: Former     Types: Cigarettes     Quit date: 1992     Years since quittin.4     Smokeless tobacco: Never   Vaping Use     Vaping status: Never Used   Substance Use Topics     Alcohol use: Yes     Comment: < 1 drink a month         CURRENT MEDICATIONS:   Current Outpatient Medications   Medication     acetaminophen (TYLENOL) 325 MG tablet     [START ON 2023] artificial tears (GENTEAL) 0.1-0.2-0.3 % ophthalmic solution     atorvastatin (LIPITOR) 40 MG tablet     blood glucose (ONETOUCH VERIO IQ) test strip     brimonidine (ALPHAGAN-P) 0.15 % ophthalmic solution     Cholecalciferol (VITAMIN D3) 25 MCG TABS     Continuous Blood Gluc  (FREESTYLE DELILAH 2 READER) TENNILLE     Continuous Blood Gluc Sensor (FREESTYLE DELILAH 2 SENSOR) MISC     insulin aspart (NOVOLOG PEN) 100 UNIT/ML pen     insulin degludec (TRESIBA FLEXTOUCH) 100 UNIT/ML pen     insulin pen needle (BD PEN NEEDLE DONTAE 2ND GEN) 32G X 4 MM miscellaneous     latanoprost (XALATAN) 0.005 % ophthalmic solution     levothyroxine (SYNTHROID/LEVOTHROID) 112 MCG tablet     ONETOUCH DELICA LANCETS 33G MISC     oxyCODONE (OXYCONTIN) 20 MG 12 hr tablet     oxyCODONE IR (ROXICODONE) 10  MG tablet     [START ON 6/25/2023] prochlorperazine (COMPAZINE) 10 MG tablet     valACYclovir (VALTREX) 1000 mg tablet     VITRON-C  MG TABS tablet     No current facility-administered medications for this visit.         ALLERGIES:  Allergies   Allergen Reactions     No Clinical Screening - See Comments Other (See Comments)     Beta blocker in glaucoma gtt.s caused low pulse and passing out      Timolol      Beta blocker in glaucoma gtt.s caused low pulse and passing out   - patient thinks it was timolol but not 100% sure which beta blocker eye drop.      Bactrim [Sulfamethoxazole-Trimethoprim] Rash           ROS the patient notes a longstanding history of problems with his right eye, he also has right ptosis.  He denies any visual changes in his left eye.  He is right-hand dominant.  Wearing glasses.  He denies any CNS complex headaches nausea vomiting swelling or speech dysfunction.  He is eating well.  He just does not really have much of an appetite.  5 pound weight loss over the past month.  He denies any chest pain focal back pain abdominal complaints outside of constipation secondary to narcotics.  He denies any falls at home.  He just feels pain with flexion of the left hip.  He is urinating without a problem.  Currently using Metamucil but needs to get on MiraLAX.  Currently on oxycodone 10 mg a day with some benefit.  Has not started long-acting narcotics because of insurance refusal.        VITAL SIGNS:  There were no vitals taken for this visit.  Wt Readings from Last 4 Encounters:   06/12/23 73.7 kg (162 lb 7.7 oz)   06/12/23 72.3 kg (159 lb 6.3 oz)   05/31/23 73.7 kg (162 lb 6.4 oz)   05/18/23 75.8 kg (167 lb)       PHYSICAL EXAM:  Alert responsive and appropriate.  Head neck exam.  Neck is clean no adenopathy noted.  He has right-sided ptosis - longstanding  Nothing of concern outside of ptosis.  Tongue protrudes midline. Mucosa is dry    Extremities:  normal , normal strength and function.   No edema in the lower extremities.  He is able to flex and extend at the right hip without issue.  He has pain with flexion and extension at the left hip.  He has pain with left hip inversion and eversion secondary to disease.    Cardiovascular exam: Sinus rhythm, I do not appreciate a murmur.  Lungs: Clear to auscultation nonlabored breathing no wheezing or rales.        DANILO Pierce CNP       full range of motion in all extremities

## 2023-07-11 ENCOUNTER — OUTPATIENT (OUTPATIENT)
Dept: OUTPATIENT SERVICES | Facility: HOSPITAL | Age: 53
LOS: 1 days | End: 2023-07-11
Payer: COMMERCIAL

## 2023-07-11 PROCEDURE — 70450 CT HEAD/BRAIN W/O DYE: CPT | Mod: 26

## 2023-07-11 PROCEDURE — 70450 CT HEAD/BRAIN W/O DYE: CPT

## 2023-07-26 NOTE — ED ADULT NURSE NOTE - CARDIO ASSESSMENT
[Dear  ___] : Dear  [unfilled], [FreeTextEntry1] : I had the pleasure of evaluating your patient, NATHAN SALCEDO, on Jul 25, 2023. \par Please see my note, attached. \par If you have any questions, please do not hesitate to contact me.\par \par Sincerely, \par \par Chastity Ballard MD\par Director, Pediatric & Adolescent Gynecology\par [facility]\par Office: [phone]  ---

## 2023-08-30 NOTE — ED ADULT NURSE NOTE - CAS EDN DISCHARGE ASSESSMENT
Rn provided patient with discharge instructions and verbalized understanding.       Zina Klein RN  08/30/23 [id number]
Alert and oriented to person, place and time

## 2024-02-20 ENCOUNTER — APPOINTMENT (OUTPATIENT)
Dept: ORTHOPEDIC SURGERY | Facility: CLINIC | Age: 54
End: 2024-02-20
Payer: SELF-PAY

## 2024-02-20 VITALS
BODY MASS INDEX: 29.8 KG/M2 | SYSTOLIC BLOOD PRESSURE: 136 MMHG | DIASTOLIC BLOOD PRESSURE: 82 MMHG | WEIGHT: 220 LBS | HEART RATE: 62 BPM | OXYGEN SATURATION: 98 % | HEIGHT: 72 IN

## 2024-02-20 DIAGNOSIS — Z86.79 PERSONAL HISTORY OF OTHER DISEASES OF THE CIRCULATORY SYSTEM: ICD-10-CM

## 2024-02-20 DIAGNOSIS — Z78.9 OTHER SPECIFIED HEALTH STATUS: ICD-10-CM

## 2024-02-20 PROCEDURE — 20610 DRAIN/INJ JOINT/BURSA W/O US: CPT | Mod: LT

## 2024-02-20 PROCEDURE — 99213 OFFICE O/P EST LOW 20 MIN: CPT | Mod: 25

## 2024-03-04 ENCOUNTER — APPOINTMENT (OUTPATIENT)
Dept: ORTHOPEDIC SURGERY | Facility: CLINIC | Age: 54
End: 2024-03-04
Payer: SELF-PAY

## 2024-03-04 PROCEDURE — 99212 OFFICE O/P EST SF 10 MIN: CPT | Mod: NC

## 2024-04-04 ENCOUNTER — APPOINTMENT (OUTPATIENT)
Dept: ORTHOPEDIC SURGERY | Facility: CLINIC | Age: 54
End: 2024-04-04

## 2024-04-09 ENCOUNTER — APPOINTMENT (OUTPATIENT)
Dept: ORTHOPEDIC SURGERY | Facility: CLINIC | Age: 54
End: 2024-04-09
Payer: SELF-PAY

## 2024-04-09 VITALS
SYSTOLIC BLOOD PRESSURE: 150 MMHG | TEMPERATURE: 97.9 F | HEART RATE: 66 BPM | BODY MASS INDEX: 29.8 KG/M2 | HEIGHT: 72 IN | OXYGEN SATURATION: 98 % | WEIGHT: 220 LBS | DIASTOLIC BLOOD PRESSURE: 98 MMHG

## 2024-04-09 DIAGNOSIS — M17.12 UNILATERAL PRIMARY OSTEOARTHRITIS, LEFT KNEE: ICD-10-CM

## 2024-04-09 PROCEDURE — 0232T NJX PLATELET PLASMA: CPT | Mod: 25

## 2024-04-09 PROCEDURE — 20610 DRAIN/INJ JOINT/BURSA W/O US: CPT | Mod: LT

## 2024-04-10 PROBLEM — M17.12 PATELLOFEMORAL ARTHRITIS OF LEFT KNEE: Status: ACTIVE | Noted: 2017-05-11

## 2024-04-10 RX ORDER — CHLORDIAZEPOXIDE HYDROCHLORIDE AND CLIDINIUM BROMIDE 5; 2.5 MG/1; MG/1
5-2.5 CAPSULE, GELATIN COATED ORAL
Qty: 30 | Refills: 0 | Status: COMPLETED | COMMUNITY
Start: 2018-03-01 | End: 2024-04-10

## 2024-04-10 RX ORDER — HYALURONATE SODIUM, STABILIZED 88 MG/4 ML
88 SYRINGE (ML) INTRAARTICULAR
Qty: 1 | Refills: 0 | Status: COMPLETED | COMMUNITY
Start: 2024-03-14 | End: 2024-04-09

## 2024-04-10 RX ORDER — HYALURONATE SODIUM, STABILIZED 88 MG/4 ML
88 SYRINGE (ML) INTRAARTICULAR
Qty: 1 | Refills: 0 | Status: COMPLETED | COMMUNITY
Start: 2024-03-11 | End: 2024-04-09

## 2024-04-10 RX ORDER — AMOXICILLIN AND CLAVULANATE POTASSIUM 875; 125 MG/1; MG/1
875-125 TABLET, COATED ORAL
Qty: 20 | Refills: 0 | Status: COMPLETED | COMMUNITY
Start: 2022-01-24 | End: 2024-04-10

## 2024-04-10 NOTE — END OF VISIT
[FreeTextEntry3] :  All medical record entries made by BUFFY Hernandez, acting as a scribe for this encounter under the direction of Timmy Connor MD . I have reviewed the chart and agree that the record accurately reflects my personal performance of the history, physical exam, assessment and plan. I have also personally directed, reviewed, and agreed with the chart.

## 2024-04-10 NOTE — PROCEDURE
[de-identified] : CC: left knee pain and stiffness DX" left knee DJD    The patient presents today for a PRP injection left knee. He verbalized informed consent for the procedure.    Then, using strict sterile technique, 15ccs of blood was aspirated from his right arm. Using the Venuu ACP system, the blood was processed in a sterile manner. The PRP was carefully . Then under strict sterile technique,  4.5ccs of centifuged precipitate was then injected intraarticularly into the left knee, along with a 3mLs of Durolane.    The patient tolerated the procedure well. He was advised to avoid NSAIDS and avoid icing for the next 72 hours. he will follow up in ten days for the second injection. He will call if any questions or problems should arise.         Durolane injection - Left knee joint Lot #:[id number] Exp:08- Man: Ayan NDC: [id number]

## 2024-05-14 ENCOUNTER — APPOINTMENT (OUTPATIENT)
Dept: ORTHOPEDIC SURGERY | Facility: CLINIC | Age: 54
End: 2024-05-14
Payer: SELF-PAY

## 2024-05-14 VITALS
HEIGHT: 72 IN | SYSTOLIC BLOOD PRESSURE: 133 MMHG | DIASTOLIC BLOOD PRESSURE: 82 MMHG | OXYGEN SATURATION: 97 % | HEART RATE: 66 BPM | BODY MASS INDEX: 29.8 KG/M2 | WEIGHT: 220 LBS

## 2024-05-14 DIAGNOSIS — M25.462 EFFUSION, LEFT KNEE: ICD-10-CM

## 2024-05-14 PROCEDURE — 99213 OFFICE O/P EST LOW 20 MIN: CPT

## 2024-05-15 NOTE — HISTORY OF PRESENT ILLNESS
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 He has a medial meniscus tear and has had no significant relief from attempts at conservative management with cortisone and PRP injections. He reports ongoing and worsening medial knee pain and swelling. He has new pain deep in the medial tibia which has been waking him from sleep. He tried a home PT program and his pain increased. He has been taking Advil and tylenol without relief. He has difficulty walking.

## 2024-05-15 NOTE — PHYSICAL EXAM
[de-identified] : The patient is a well developed, well nourished male in no apparent distress. He is alert and oriented X 3 with a pleasant mood and appropriate affect.     On physical examination of the left knee, his ROM is 0-125 degrees.  The patient walks with a normal gait and stands in neutral alignment. There is 1+  effusion. No warmth or erythema is noted. The patella is non tender to palpation medially or laterally. There is no crepitus noted. The apprehension and grind tests are negative. The extensor mechanism is intact. There is medial l joint line tenderness. The Cortney sign is positivet. The Lachman and pivot shift tests are negative. There is no varus or valgus laxity at 0 or 30 degrees. No posterolateral or anteromedial laxity is noted. No masses are palpable. No other soft tissue or bony tenderness is noted. Quadriceps weakness is noted. Neurovascular function is intact.

## 2024-05-15 NOTE — DISCUSSION/SUMMARY
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 he has worsening medial knee pain and will be sent for an MRI to ro insuffiency fracture vs displaced flap tear. we will call him with the results. He will likely require arthroscopy. all questions were answered. He will call if any issues arise

## 2024-06-14 ENCOUNTER — APPOINTMENT (OUTPATIENT)
Dept: ORTHOPEDIC SURGERY | Facility: AMBULATORY SURGERY CENTER | Age: 54
End: 2024-06-14

## 2024-07-17 ENCOUNTER — TRANSCRIPTION ENCOUNTER (OUTPATIENT)
Age: 54
End: 2024-07-17

## 2024-07-17 NOTE — ASU PATIENT PROFILE, ADULT - NSICDXPASTMEDICALHX_GEN_ALL_CORE_FT
PAST MEDICAL HISTORY:  Chest pain     HTN (hypertension)      PAST MEDICAL HISTORY:  Chest pain denied    HTN (hypertension)     Hyperlipidemia

## 2024-07-17 NOTE — ASU PATIENT PROFILE, ADULT - NS PREOP UNDERSTANDS INFO
bring photo id/insurance/credit cards .no solid food for before midnight surgery/no chewing gums or hard candy while fasting. clears till 06:00 am. wear loose comfortable fitting clothes/ no lotion/perfume/ jewelry or make up. address and phone number given. must have an escort./yes

## 2024-07-18 ENCOUNTER — OUTPATIENT (OUTPATIENT)
Dept: OUTPATIENT SERVICES | Facility: HOSPITAL | Age: 54
LOS: 1 days | Discharge: HOME | End: 2024-07-18
Payer: COMMERCIAL

## 2024-07-18 ENCOUNTER — APPOINTMENT (OUTPATIENT)
Dept: ORTHOPEDIC SURGERY | Facility: AMBULATORY SURGERY CENTER | Age: 54
End: 2024-07-18

## 2024-07-18 ENCOUNTER — TRANSCRIPTION ENCOUNTER (OUTPATIENT)
Age: 54
End: 2024-07-18

## 2024-07-18 VITALS
SYSTOLIC BLOOD PRESSURE: 117 MMHG | HEART RATE: 64 BPM | DIASTOLIC BLOOD PRESSURE: 72 MMHG | OXYGEN SATURATION: 99 % | TEMPERATURE: 98 F | RESPIRATION RATE: 16 BRPM

## 2024-07-18 VITALS
OXYGEN SATURATION: 98 % | DIASTOLIC BLOOD PRESSURE: 87 MMHG | SYSTOLIC BLOOD PRESSURE: 134 MMHG | WEIGHT: 228.84 LBS | TEMPERATURE: 98 F | HEIGHT: 72 IN | HEART RATE: 82 BPM | RESPIRATION RATE: 16 BRPM

## 2024-07-18 DIAGNOSIS — Z98.890 OTHER SPECIFIED POSTPROCEDURAL STATES: Chronic | ICD-10-CM

## 2024-07-18 PROCEDURE — 29881 ARTHRS KNE SRG MNISECTMY M/L: CPT | Mod: LT

## 2024-07-18 RX ORDER — FENTANYL CITRATE 50 UG/ML
25 INJECTION, SOLUTION INTRAMUSCULAR; INTRAVENOUS
Refills: 0 | Status: DISCONTINUED | OUTPATIENT
Start: 2024-07-18 | End: 2024-07-18

## 2024-07-18 RX ORDER — DEXTROSE MONOHYDRATE AND SODIUM CHLORIDE 5; .3 G/100ML; G/100ML
1000 INJECTION, SOLUTION INTRAVENOUS
Refills: 0 | Status: DISCONTINUED | OUTPATIENT
Start: 2024-07-18 | End: 2024-07-18

## 2024-07-18 NOTE — BRIEF OPERATIVE NOTE - TYPE OF ANESTHESIA
Reason For Visit  GIOVANI FOSTER is here today for a nurse visit for patient here for monthly b12 injection given without complications.      Current Meds   1. Lisinopril 2.5 MG Oral Tablet; take one tablet by mouth one time daily;   Therapy: [id number] to (Last Rx:18Oct2016)  Requested for: 18Oct2016 Ordered    Allergies  No Known Drug Allergies    Plan   1. med B12 1000MCG; 1000 mcg I.M once a month; To Be Done: 28Mar2017    Signatures   Electronically signed by : Edith Dickinson CMA; Mar 28 2017 10:31AM CST    Electronically signed by : NENO SO D.O.; Mar 28 2017 11:06AM CST     General

## 2024-07-18 NOTE — PRE-ANESTHESIA EVALUATION ADULT - NSANTHOSAYNRD_GEN_A_CORE
No. KANCHAN screening performed.  STOP BANG Legend: 0-2 = LOW Risk; 3-4 = INTERMEDIATE Risk; 5-8 = HIGH Risk

## 2024-07-18 NOTE — ASU DISCHARGE PLAN (ADULT/PEDIATRIC) - NS MD DC FALL RISK RISK
For information on Fall & Injury Prevention, visit: [email or web address] OR  [email or web address] OR  https://www.cdc.gov/steadi/patient.html

## 2024-07-18 NOTE — BRIEF OPERATIVE NOTE - OPERATION/FINDINGS
left knee arthroscopy with partial medial meniscectomy, medial and PF DJD , please see full op note for more detail

## 2024-08-01 ENCOUNTER — APPOINTMENT (OUTPATIENT)
Dept: ORTHOPEDIC SURGERY | Facility: CLINIC | Age: 54
End: 2024-08-01
Payer: COMMERCIAL

## 2024-08-01 VITALS
HEART RATE: 73 BPM | SYSTOLIC BLOOD PRESSURE: 133 MMHG | HEIGHT: 72 IN | BODY MASS INDEX: 29.8 KG/M2 | WEIGHT: 220 LBS | TEMPERATURE: 98.1 F | DIASTOLIC BLOOD PRESSURE: 80 MMHG | OXYGEN SATURATION: 95 %

## 2024-08-01 DIAGNOSIS — S83.242A OTHER TEAR OF MEDIAL MENISCUS, CURRENT INJURY, LEFT KNEE, INITIAL ENCOUNTER: ICD-10-CM

## 2024-08-01 PROBLEM — E78.5 HYPERLIPIDEMIA, UNSPECIFIED: Chronic | Status: ACTIVE | Noted: 2024-07-18

## 2024-08-01 PROCEDURE — 99024 POSTOP FOLLOW-UP VISIT: CPT

## 2024-08-02 PROBLEM — S83.242A ACUTE MEDIAL MENISCAL TEAR, LEFT, INITIAL ENCOUNTER: Status: ACTIVE | Noted: 2024-08-02

## 2024-08-02 NOTE — HISTORY OF PRESENT ILLNESS
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 His sutures were removed last week, he has returned to operating. he has some persistent anterior knee pain and pain w stairs [de-identified] : on exam of his left knee, his Rom is 0-115 degrees.  There is trace effusion. No warmth or erythema is noted. There is no joint line tenderness. There is mild quad atrophy. Incisions are clean cry and intact  [de-identified] : Surgical photos were reviewed and findings were discussed. he understands that he has some PF DJD. He will increase his activities as tolerated. We will see him back in one month for reevaluation. he will call if any issues arise

## 2024-08-02 NOTE — END OF VISIT
[FreeTextEntry3] : Dr Connor has reviewed the chart and agrees that the record accurately reflects his personal performance of the history, physical exam, assessment, and plan. He personally directed, reviewed, and agreed with the chart.All medical record entries made by BUFFY Hernandez, acting as a scribe for this encounter under the direction of Timmy Connor MD

## 2024-08-02 NOTE — HISTORY OF PRESENT ILLNESS
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 His sutures were removed last week, he has returned to operating. he has some persistent anterior knee pain and pain w stairs [de-identified] : on exam of his left knee, his Rom is 0-115 degrees.  There is trace effusion. No warmth or erythema is noted. There is no joint line tenderness. There is mild quad atrophy. Incisions are clean cry and intact  [de-identified] : Surgical photos were reviewed and findings were discussed. he understands that he has some PF DJD. He will increase his activities as tolerated. We will see him back in one month for reevaluation. he will call if any issues arise

## 2024-08-02 NOTE — HISTORY OF PRESENT ILLNESS
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 His sutures were removed last week, he has returned to operating. he has some persistent anterior knee pain and pain w stairs [de-identified] : on exam of his left knee, his Rom is 0-115 degrees.  There is trace effusion. No warmth or erythema is noted. There is no joint line tenderness. There is mild quad atrophy. Incisions are clean cry and intact  [de-identified] : Surgical photos were reviewed and findings were discussed. he understands that he has some PF DJD. He will increase his activities as tolerated. We will see him back in one month for reevaluation. he will call if any issues arise

## 2024-09-05 ENCOUNTER — APPOINTMENT (OUTPATIENT)
Dept: ORTHOPEDIC SURGERY | Facility: CLINIC | Age: 54
End: 2024-09-05
Payer: SELF-PAY

## 2024-09-05 VITALS
HEART RATE: 68 BPM | DIASTOLIC BLOOD PRESSURE: 87 MMHG | OXYGEN SATURATION: 98 % | TEMPERATURE: 98.4 F | BODY MASS INDEX: 29.8 KG/M2 | HEIGHT: 72 IN | SYSTOLIC BLOOD PRESSURE: 148 MMHG | WEIGHT: 220 LBS

## 2024-09-05 DIAGNOSIS — S83.242A OTHER TEAR OF MEDIAL MENISCUS, CURRENT INJURY, LEFT KNEE, INITIAL ENCOUNTER: ICD-10-CM

## 2024-09-05 PROCEDURE — 99024 POSTOP FOLLOW-UP VISIT: CPT

## 2024-09-06 NOTE — HISTORY OF PRESENT ILLNESS
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 He is six weeks s/p left knee arthroscopy with partial medial meniscectomy [de-identified] : He is doing quite well. He denies any significant pain or swelling. He has been walking with greater ease. He denies any locking or buckling,   [de-identified] : on exam of his left knee, his ROM is 0-125 degrees. There is no effusion. No warmth or erythema is noted. Sutures are well healed. 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 He will increase his activities as tolerated. He will slolwy increase in preparation to return to soccer.All questions were answered. He will call if any issues arise

## 2024-09-06 NOTE — HISTORY OF PRESENT ILLNESS
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 He is six weeks s/p left knee arthroscopy with partial medial meniscectomy [de-identified] : He is doing quite well. He denies any significant pain or swelling. He has been walking with greater ease. He denies any locking or buckling,   [de-identified] : on exam of his left knee, his ROM is 0-125 degrees. There is no effusion. No warmth or erythema is noted. Sutures are well healed. 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 He will increase his activities as tolerated. He will slolwy increase in preparation to return to soccer.All questions were answered. He will call if any issues arise

## 2024-09-06 NOTE — HISTORY OF PRESENT ILLNESS
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 He is six weeks s/p left knee arthroscopy with partial medial meniscectomy [de-identified] : He is doing quite well. He denies any significant pain or swelling. He has been walking with greater ease. He denies any locking or buckling,   [de-identified] : on exam of his left knee, his ROM is 0-125 degrees. There is no effusion. No warmth or erythema is noted. Sutures are well healed. 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 He will increase his activities as tolerated. He will slolwy increase in preparation to return to soccer.All questions were answered. He will call if any issues arise

## 2024-10-01 NOTE — ED PROVIDER NOTE - OBJECTIVE STATEMENT
Instructions: This plan will send the code FBSE to the PM system.  DO NOT or CHANGE the price. Detail Level: Simple Price (Do Not Change): 0.00 48 y/o m with pmh of htn and hld presents with symptoms of right eye vision loss lasting 20 seconds followed by right sided temporal headache.  Symptoms started morning prior while watching US open.  Pt states it was a progression of vision loss from top visual field to lower visual field.  Unsure about peripheral vision or about vision in other eye at the time.  In ED now, pt states he has a slight headache but denies active visual loss or other neuro deficits.  No extremity weakness, numbness, chest pain, shortness of breath.  Pt has normal stress test in last year.

## 2025-04-23 ENCOUNTER — NON-APPOINTMENT (OUTPATIENT)
Age: 55
End: 2025-04-23

## 2025-04-23 DIAGNOSIS — M17.0 BILATERAL PRIMARY OSTEOARTHRITIS OF KNEE: ICD-10-CM

## 2025-05-02 RX ORDER — HYALURONATE SODIUM, STABILIZED 60 MG/3 ML
60 SYRINGE (ML) INTRAARTICULAR
Qty: 2 | Refills: 0 | Status: ACTIVE | COMMUNITY
Start: 2025-04-23

## 2025-05-05 ENCOUNTER — APPOINTMENT (OUTPATIENT)
Dept: ORTHOPEDIC SURGERY | Facility: CLINIC | Age: 55
End: 2025-05-05

## 2025-05-05 PROCEDURE — 20610 DRAIN/INJ JOINT/BURSA W/O US: CPT | Mod: LT

## 2025-05-08 DIAGNOSIS — M17.0 BILATERAL PRIMARY OSTEOARTHRITIS OF KNEE: ICD-10-CM

## 2025-05-08 RX ORDER — HYALURONATE SODIUM, STABILIZED 60 MG/3 ML
60 SYRINGE (ML) INTRAARTICULAR
Qty: 2 | Refills: 0 | Status: ACTIVE | COMMUNITY
Start: 2025-05-08

## 2025-05-23 DIAGNOSIS — M25.462 EFFUSION, LEFT KNEE: ICD-10-CM

## 2025-05-29 RX ORDER — CELECOXIB 100 MG/1
100 CAPSULE ORAL TWICE DAILY
Qty: 60 | Refills: 0 | Status: ACTIVE | COMMUNITY
Start: 2025-05-29 | End: 1900-01-01

## (undated) DEVICE — SHAVER BLADE GATOR 4.2MM X 19CM

## (undated) DEVICE — PREP CHLORAPREP HI-LITE ORANGE 26ML

## (undated) DEVICE — SUT MONOCRYL 3-0 18" PS-2 UNDYED

## (undated) DEVICE — POSITIONER FOAM EGG CRATE ULNAR 2PCS (PINK)

## (undated) DEVICE — PACK KNEE ARTHROSCOPY

## (undated) DEVICE — VENODYNE/SCD SLEEVE CALF MEDIUM

## (undated) DEVICE — SUT ETHILON 4-0 18" FS-2

## (undated) DEVICE — DRSG ACE BANDAGE 6"

## (undated) DEVICE — MARKING PEN W RULER

## (undated) DEVICE — TOURNIQUET CUFF 34" DUAL PORT W PLC

## (undated) DEVICE — DRSG WEBRIL 4"

## (undated) DEVICE — DRAPE C ARM MINI PACK FOR 6800

## (undated) DEVICE — S&N ARTHROCARE WAND COBLATION WEREWOLF FLOW 90

## (undated) DEVICE — ARTHREX TORPEDO 4MMX13CM

## (undated) DEVICE — ARTHREX DISSECTOR ENDO 3.5MM X 13CM

## (undated) DEVICE — DRSG XEROFORM 5 X 9"

## (undated) DEVICE — GLV 8.5 PROTEXIS (WHITE)

## (undated) DEVICE — SOL IRR BAG NS 0.9% 3000ML